# Patient Record
Sex: MALE | Race: WHITE | NOT HISPANIC OR LATINO | Employment: PART TIME | URBAN - METROPOLITAN AREA
[De-identification: names, ages, dates, MRNs, and addresses within clinical notes are randomized per-mention and may not be internally consistent; named-entity substitution may affect disease eponyms.]

---

## 2017-04-05 ENCOUNTER — HOSPITAL ENCOUNTER (EMERGENCY)
Facility: HOSPITAL | Age: 67
Discharge: HOME | End: 2017-04-06
Attending: EMERGENCY MEDICINE | Admitting: EMERGENCY MEDICINE

## 2017-04-05 ENCOUNTER — APPOINTMENT (OUTPATIENT)
Dept: GENERAL RADIOLOGY | Facility: HOSPITAL | Age: 67
End: 2017-04-05

## 2017-04-05 DIAGNOSIS — R07.89 ATYPICAL CHEST PAIN: Primary | ICD-10-CM

## 2017-04-05 LAB
ALBUMIN SERPL-MCNC: 4.6 G/DL (ref 3.2–4.8)
ALBUMIN/GLOB SERPL: 1.6 G/DL (ref 1.5–2.5)
ALP SERPL-CCNC: 93 U/L (ref 25–100)
ALT SERPL W P-5'-P-CCNC: 53 U/L (ref 7–40)
ANION GAP SERPL CALCULATED.3IONS-SCNC: 9 MMOL/L (ref 3–11)
AST SERPL-CCNC: 33 U/L (ref 0–33)
BASOPHILS # BLD AUTO: 0.03 10*3/MM3 (ref 0–0.2)
BASOPHILS NFR BLD AUTO: 0.4 % (ref 0–1)
BILIRUB SERPL-MCNC: 0.7 MG/DL (ref 0.3–1.2)
BNP SERPL-MCNC: 15 PG/ML (ref 0–100)
BUN BLD-MCNC: 15 MG/DL (ref 9–23)
BUN/CREAT SERPL: 12.5 (ref 7–25)
CALCIUM SPEC-SCNC: 9.9 MG/DL (ref 8.7–10.4)
CHLORIDE SERPL-SCNC: 101 MMOL/L (ref 99–109)
CO2 SERPL-SCNC: 29 MMOL/L (ref 20–31)
CREAT BLD-MCNC: 1.2 MG/DL (ref 0.6–1.3)
DEPRECATED RDW RBC AUTO: 44.3 FL (ref 37–54)
EOSINOPHIL # BLD AUTO: 0.12 10*3/MM3 (ref 0.1–0.3)
EOSINOPHIL NFR BLD AUTO: 1.5 % (ref 0–3)
ERYTHROCYTE [DISTWIDTH] IN BLOOD BY AUTOMATED COUNT: 13.4 % (ref 11.3–14.5)
GFR SERPL CREATININE-BSD FRML MDRD: 61 ML/MIN/1.73
GLOBULIN UR ELPH-MCNC: 2.8 GM/DL
GLUCOSE BLD-MCNC: 282 MG/DL (ref 70–100)
HCT VFR BLD AUTO: 42.6 % (ref 38.9–50.9)
HGB BLD-MCNC: 14 G/DL (ref 13.1–17.5)
IMM GRANULOCYTES # BLD: 0.01 10*3/MM3 (ref 0–0.03)
IMM GRANULOCYTES NFR BLD: 0.1 % (ref 0–0.6)
LIPASE SERPL-CCNC: 57 U/L (ref 6–51)
LYMPHOCYTES # BLD AUTO: 3 10*3/MM3 (ref 0.6–4.8)
LYMPHOCYTES NFR BLD AUTO: 36.5 % (ref 24–44)
MCH RBC QN AUTO: 30 PG (ref 27–31)
MCHC RBC AUTO-ENTMCNC: 32.9 G/DL (ref 32–36)
MCV RBC AUTO: 91.2 FL (ref 80–99)
MONOCYTES # BLD AUTO: 0.66 10*3/MM3 (ref 0–1)
MONOCYTES NFR BLD AUTO: 8 % (ref 0–12)
NEUTROPHILS # BLD AUTO: 4.41 10*3/MM3 (ref 1.5–8.3)
NEUTROPHILS NFR BLD AUTO: 53.5 % (ref 41–71)
PLATELET # BLD AUTO: 197 10*3/MM3 (ref 150–450)
PMV BLD AUTO: 9.1 FL (ref 6–12)
POTASSIUM BLD-SCNC: 3.9 MMOL/L (ref 3.5–5.5)
PROT SERPL-MCNC: 7.4 G/DL (ref 5.7–8.2)
RBC # BLD AUTO: 4.67 10*6/MM3 (ref 4.2–5.76)
SODIUM BLD-SCNC: 139 MMOL/L (ref 132–146)
TROPONIN I SERPL-MCNC: 0 NG/ML (ref 0–0.07)
WBC NRBC COR # BLD: 8.23 10*3/MM3 (ref 3.5–10.8)

## 2017-04-05 PROCEDURE — 85025 COMPLETE CBC W/AUTO DIFF WBC: CPT | Performed by: EMERGENCY MEDICINE

## 2017-04-05 PROCEDURE — 71010 HC CHEST PA OR AP: CPT

## 2017-04-05 PROCEDURE — 99283 EMERGENCY DEPT VISIT LOW MDM: CPT

## 2017-04-05 PROCEDURE — 80053 COMPREHEN METABOLIC PANEL: CPT | Performed by: EMERGENCY MEDICINE

## 2017-04-05 PROCEDURE — 84484 ASSAY OF TROPONIN QUANT: CPT

## 2017-04-05 PROCEDURE — 83880 ASSAY OF NATRIURETIC PEPTIDE: CPT | Performed by: EMERGENCY MEDICINE

## 2017-04-05 PROCEDURE — 83690 ASSAY OF LIPASE: CPT | Performed by: EMERGENCY MEDICINE

## 2017-04-05 PROCEDURE — 93005 ELECTROCARDIOGRAM TRACING: CPT

## 2017-04-05 RX ORDER — SODIUM CHLORIDE 0.9 % (FLUSH) 0.9 %
10 SYRINGE (ML) INJECTION AS NEEDED
Status: DISCONTINUED | OUTPATIENT
Start: 2017-04-05 | End: 2017-04-06 | Stop reason: HOSPADM

## 2017-04-05 RX ORDER — FAMOTIDINE 20 MG/1
20 TABLET, FILM COATED ORAL ONCE
Status: COMPLETED | OUTPATIENT
Start: 2017-04-05 | End: 2017-04-05

## 2017-04-05 RX ORDER — DICYCLOMINE HYDROCHLORIDE 10 MG/1
20 CAPSULE ORAL ONCE
Status: COMPLETED | OUTPATIENT
Start: 2017-04-05 | End: 2017-04-05

## 2017-04-05 RX ORDER — MAGNESIUM HYDROXIDE/ALUMINUM HYDROXICE/SIMETHICONE 120; 1200; 1200 MG/30ML; MG/30ML; MG/30ML
30 SUSPENSION ORAL ONCE
Status: COMPLETED | OUTPATIENT
Start: 2017-04-05 | End: 2017-04-05

## 2017-04-05 RX ORDER — METFORMIN HYDROCHLORIDE 500 MG/1
500 TABLET, EXTENDED RELEASE ORAL
COMMUNITY
End: 2017-07-14

## 2017-04-05 RX ORDER — ASPIRIN 81 MG/1
324 TABLET, CHEWABLE ORAL ONCE
Status: COMPLETED | OUTPATIENT
Start: 2017-04-05 | End: 2017-04-05

## 2017-04-05 RX ADMIN — DICYCLOMINE HYDROCHLORIDE 20 MG: 10 CAPSULE ORAL at 23:27

## 2017-04-05 RX ADMIN — ALUMINUM HYDROXIDE, MAGNESIUM HYDROXIDE, AND SIMETHICONE 30 ML: 200; 200; 20 SUSPENSION ORAL at 23:27

## 2017-04-05 RX ADMIN — ASPIRIN 324 MG: 81 TABLET, CHEWABLE ORAL at 20:58

## 2017-04-05 RX ADMIN — LIDOCAINE HYDROCHLORIDE 15 ML: 20 SOLUTION ORAL; TOPICAL at 23:27

## 2017-04-05 RX ADMIN — FAMOTIDINE 20 MG: 20 TABLET, FILM COATED ORAL at 23:27

## 2017-04-06 VITALS
HEIGHT: 72 IN | WEIGHT: 250 LBS | HEART RATE: 60 BPM | BODY MASS INDEX: 33.86 KG/M2 | SYSTOLIC BLOOD PRESSURE: 143 MMHG | TEMPERATURE: 98 F | OXYGEN SATURATION: 96 % | DIASTOLIC BLOOD PRESSURE: 86 MMHG | RESPIRATION RATE: 18 BRPM

## 2017-04-06 LAB
HOLD SPECIMEN: NORMAL
HOLD SPECIMEN: NORMAL
WHOLE BLOOD HOLD SPECIMEN: NORMAL
WHOLE BLOOD HOLD SPECIMEN: NORMAL

## 2017-04-06 RX ORDER — SUCRALFATE 1 G/1
1 TABLET ORAL
Qty: 60 TABLET | Refills: 0 | Status: SHIPPED | OUTPATIENT
Start: 2017-04-06 | End: 2017-07-14

## 2017-04-06 RX ORDER — DICYCLOMINE HCL 20 MG
20 TABLET ORAL EVERY 6 HOURS PRN
Qty: 20 TABLET | Refills: 0 | Status: SHIPPED | OUTPATIENT
Start: 2017-04-06 | End: 2017-07-14

## 2017-04-06 NOTE — ED PROVIDER NOTES
Subjective   HPI Comments: Maxime Rodriguez is a 66 y.o.male who presents to the ED with c/o chest pain which onset one week ago. He reports a sudden onset of substernal and left sided chest pain radiating into his neck after eating one week ago. He describes the pain as a soreness and pressure. He initially took Gas-X without any relief. The pain remained constant and he was seen at his PCP office yesterday for his symptoms. He was started on Prilosec at that time without any relief. He has history of heartburn but states the episodes have never lasted this long. He notes he had a negative stress test performed on March 13th. He was also started on Metformin roughly 30 days ago. He denies any other complaints at this time.       Patient is a 66 y.o. male presenting with chest pain.   History provided by:  Patient  Chest Pain   Pain location:  Substernal area and L chest  Pain quality: pressure    Pain quality comment:  Soreness  Pain radiates to:  Neck  Pain severity:  Moderate  Onset quality:  Sudden  Duration:  1 week  Timing:  Constant  Progression:  Unchanged  Chronicity:  New  Context: eating    Relieved by:  Nothing  Worsened by:  Nothing  Ineffective treatments: Gas X and Prilosec.  Associated symptoms: no abdominal pain, no back pain, no cough, no diaphoresis, no dizziness, no fever, no headache, no lower extremity edema, no nausea, no shortness of breath, no vomiting and no weakness    Risk factors: diabetes mellitus and obesity        Review of Systems   Constitutional: Negative for chills, diaphoresis and fever.   HENT: Negative for congestion, rhinorrhea and sore throat.    Respiratory: Negative for cough and shortness of breath.    Cardiovascular: Positive for chest pain.   Gastrointestinal: Negative for abdominal pain, diarrhea, nausea and vomiting.   Musculoskeletal: Negative for back pain and neck pain.   Neurological: Negative for dizziness, weakness, light-headedness and headaches.   All other systems  reviewed and are negative.      Past Medical History:   Diagnosis Date   • Diabetes mellitus    • Hyperlipidemia    • Hypertension        Allergies   Allergen Reactions   • Atorvastatin    • Augmentin [Amoxicillin-Pot Clavulanate]    • Cephalexin    • Penicillins        Past Surgical History:   Procedure Laterality Date   • HEMORRHOIDECTOMY     • TONSILLECTOMY         History reviewed. No pertinent family history.    Social History     Social History   • Marital status:      Spouse name: N/A   • Number of children: N/A   • Years of education: N/A     Social History Main Topics   • Smoking status: Never Smoker   • Smokeless tobacco: None   • Alcohol use No   • Drug use: No   • Sexual activity: Defer     Other Topics Concern   • None     Social History Narrative   • None         Objective   Physical Exam   Constitutional: He is oriented to person, place, and time. He appears well-developed and well-nourished. No distress.   HENT:   Head: Normocephalic and atraumatic.   Nose: Nose normal.   Mouth/Throat: Oropharynx is clear and moist.   Eyes: Conjunctivae are normal. Pupils are equal, round, and reactive to light. No scleral icterus.   Neck: Normal range of motion. Neck supple.   Cardiovascular: Normal rate, regular rhythm and normal heart sounds.    Pulmonary/Chest: Effort normal and breath sounds normal. No respiratory distress.   Abdominal: Soft. Bowel sounds are normal. There is no tenderness.   Musculoskeletal: Normal range of motion. He exhibits no edema.   Neurological: He is alert and oriented to person, place, and time.   Skin: Skin is warm and dry.   Psychiatric: He has a normal mood and affect. His behavior is normal.   Nursing note and vitals reviewed.      Procedures         ED Course  ED Course     EKG NSR, LAD.  CXR negative.  Pt had negative nuclear stress test last month which is reassuring.  Pain better after meds.  Patient stable on serial rechecks.  Discussed findings, concerns, plan of care,  expected course, reasons to return and followup.                  MDM  Number of Diagnoses or Management Options  Atypical chest pain:      Amount and/or Complexity of Data Reviewed  Clinical lab tests: reviewed and ordered  Tests in the radiology section of CPT®: ordered and reviewed  Decide to obtain previous medical records or to obtain history from someone other than the patient: yes  Independent visualization of images, tracings, or specimens: yes        Final diagnoses:   Atypical chest pain       Documentation assistance provided by kyara Friedman.  Information recorded by the scribe was done at my direction and has been verified and validated by me.     Tabitha Friedman  04/05/17 2251       Tabitha Friedman  04/05/17 2253       Nicolas Harman MD  04/06/17 0053

## 2017-07-14 ENCOUNTER — OFFICE VISIT (OUTPATIENT)
Dept: NEUROSURGERY | Facility: CLINIC | Age: 67
End: 2017-07-14

## 2017-07-14 ENCOUNTER — HOSPITAL ENCOUNTER (OUTPATIENT)
Dept: GENERAL RADIOLOGY | Facility: HOSPITAL | Age: 67
Discharge: HOME | End: 2017-07-14
Admitting: PHYSICIAN ASSISTANT

## 2017-07-14 VITALS — WEIGHT: 242 LBS | BODY MASS INDEX: 32.78 KG/M2 | HEIGHT: 72 IN | TEMPERATURE: 98.6 F

## 2017-07-14 DIAGNOSIS — Z98.1 STATUS POST CERVICAL SPINAL FUSION: Primary | ICD-10-CM

## 2017-07-14 DIAGNOSIS — Z98.1 STATUS POST CERVICAL SPINAL FUSION: ICD-10-CM

## 2017-07-14 DIAGNOSIS — M50.30 DEGENERATION OF CERVICAL INTERVERTEBRAL DISC: ICD-10-CM

## 2017-07-14 PROCEDURE — 72040 X-RAY EXAM NECK SPINE 2-3 VW: CPT

## 2017-07-14 PROCEDURE — 99214 OFFICE O/P EST MOD 30 MIN: CPT | Performed by: PHYSICIAN ASSISTANT

## 2017-07-14 NOTE — PROGRESS NOTES
Patient: Maxime Rodriguez  : 1950  Chart #: [id number]    Date of Service: 2017    CHIEF COMPLAINT: Numbness in the ulnar fingers    History of Present Illness Mr. 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  On 2011 he underwent an uncomplicated ACDF with allografting and plating at C6 7.  Postoperatively he's had some intermittent numbness in the ulnar fingers but overall he has done quite well.  5 days ago he woke up in the middle of the night with numbness involving the pinky and ring finger on the left.  This subsided after a couple hours.  The patient was concerned because normally it only takes a few minutes to go away.  Occasionally he wakes up with stiffness in his neck but denies significant pain  He has no numbness, weakness, or pain extending down his arms.  He has no numbness in his fingers today. No walking difficulties or bowel or bladder issues.    The following portions of the patient's history were reviewed and updated as appropriate: allergies, current medications, past family history, past medical history, past social history, past surgical history and problem list.    Review of Systems   Constitutional: Negative for activity change, appetite change, chills, diaphoresis, fatigue, fever and unexpected weight change.   HENT: Negative for congestion, dental problem, drooling, ear discharge, ear pain, facial swelling, hearing loss, mouth sores, nosebleeds, postnasal drip, rhinorrhea, sinus pressure, sneezing, sore throat, tinnitus, trouble swallowing and voice change.    Eyes: Negative for photophobia, pain, discharge, redness, itching and visual disturbance.   Respiratory: Negative for apnea, cough, choking, chest tightness, shortness of breath, wheezing and stridor.    Cardiovascular: Negative for chest pain, palpitations and leg swelling.   Gastrointestinal: Negative for abdominal distention, abdominal pain,  "anal bleeding, blood in stool, constipation, diarrhea, nausea, rectal pain and vomiting.   Endocrine: Negative for cold intolerance, heat intolerance, polydipsia, polyphagia and polyuria.   Genitourinary: Negative for decreased urine volume, difficulty urinating, dysuria, enuresis, flank pain, frequency, genital sores, hematuria and urgency.   Musculoskeletal: Negative for arthralgias, back pain, gait problem, joint swelling, myalgias, neck pain and neck stiffness.   Skin: Negative for color change, pallor, rash and wound.   Allergic/Immunologic: Negative for environmental allergies, food allergies and immunocompromised state.   Neurological: Positive for numbness. Negative for dizziness, tremors, seizures, syncope, facial asymmetry, speech difficulty, weakness, light-headedness and headaches.   Hematological: Negative for adenopathy. Does not bruise/bleed easily.   Psychiatric/Behavioral: Negative for agitation, behavioral problems, confusion, decreased concentration, dysphoric mood, hallucinations, self-injury, sleep disturbance and suicidal ideas. The patient is not nervous/anxious and is not hyperactive.    All other systems reviewed and are negative.      Objective   Vital Signs: Temperature 98.6 °F (37 °C), temperature source Temporal Artery , height 72\" (182.9 cm), weight 242 lb (110 kg).  Physical Exam   Constitutional: He appears well-developed and well-nourished. No distress.   HENT:   Head: Normocephalic and atraumatic.   Eyes: EOM are normal. Pupils are equal, round, and reactive to light.   Cardiovascular: Normal rate, regular rhythm and normal heart sounds.    Pulmonary/Chest: Effort normal and breath sounds normal.   Psychiatric: He has a normal mood and affect. His behavior is normal. Thought content normal.   Nursing note and vitals reviewed.  Musculoskeletal: Range of motion of the neck is normal.  Strength is intact in upper and lower extremities to direct testing.  Muscle is normal " throughout.  Station and gait are normal.  Neurologic:  Gait: Able to tandem walk without difficulty.  Coordination is intact.  Finger to nose, heel-to-shin, rapid alternating movements.  Deep tendon reflexes: Difficult to elicit throughout.  Sensation is intact to light touch throughout.  Patient is oriented to person, place, and time.    Cornelia sign negative.  No ankle clonus    Assessment/Plan   Medical Decision Making: Mr. Rodriguez is a 66-year-old gentleman who underwent ACDF C6 7 in 2011 and has done well.  He had some transient numbness in the ulnar fingers a week ago and was concerned that he had done something to the hardware in his neck.  His symptoms could be related to a couple things but most likely due to his positioning while sleeping.  I sent him for some x-rays of the cervical spine and will call him with the results for reassurance.  If his symptoms become persistent he will call our office and I will see him for reevaluation.             Annita Patino PA-C  Patient Care Team:  Georgi Lanza MD as PCP - General (Internal Medicine)  Georgi Lanza MD as Referring Physician (Internal Medicine)            ADDENDUM: I called patient to notify him of his x-ray results.  Anterior fusion noted at C6 and C7 without hardware complication or malalignment.  Some mild degenerative changes noted at other levels.

## 2018-03-28 ENCOUNTER — APPOINTMENT (OUTPATIENT)
Dept: GENERAL RADIOLOGY | Facility: HOSPITAL | Age: 68
End: 2018-03-28

## 2018-03-28 ENCOUNTER — HOSPITAL ENCOUNTER (INPATIENT)
Facility: HOSPITAL | Age: 68
LOS: 2 days | Discharge: HOME | End: 2018-03-30
Attending: EMERGENCY MEDICINE | Admitting: INTERNAL MEDICINE

## 2018-03-28 DIAGNOSIS — I21.4 NON-STEMI (NON-ST ELEVATED MYOCARDIAL INFARCTION) (HCC): Primary | ICD-10-CM

## 2018-03-28 DIAGNOSIS — R07.9 CHEST PAIN, UNSPECIFIED TYPE: ICD-10-CM

## 2018-03-28 PROBLEM — E78.5 HYPERLIPIDEMIA: Status: ACTIVE | Noted: 2018-03-28

## 2018-03-28 PROBLEM — I10 HYPERTENSION: Status: ACTIVE | Noted: 2018-03-28

## 2018-03-28 PROBLEM — E11.9 DIABETES MELLITUS: Status: ACTIVE | Noted: 2018-03-28

## 2018-03-28 LAB
ALBUMIN SERPL-MCNC: 4.2 G/DL (ref 3.2–4.8)
ALBUMIN/GLOB SERPL: 1.8 G/DL (ref 1.5–2.5)
ALP SERPL-CCNC: 95 U/L (ref 25–100)
ALT SERPL W P-5'-P-CCNC: 56 U/L (ref 7–40)
ANION GAP SERPL CALCULATED.3IONS-SCNC: 11 MMOL/L (ref 3–11)
AST SERPL-CCNC: 36 U/L (ref 0–33)
BASOPHILS # BLD AUTO: 0.04 10*3/MM3 (ref 0–0.2)
BASOPHILS NFR BLD AUTO: 0.7 % (ref 0–1)
BILIRUB SERPL-MCNC: 0.7 MG/DL (ref 0.3–1.2)
BNP SERPL-MCNC: 10 PG/ML (ref 0–100)
BUN BLD-MCNC: 15 MG/DL (ref 9–23)
BUN/CREAT SERPL: 10.7 (ref 7–25)
CALCIUM SPEC-SCNC: 9 MG/DL (ref 8.7–10.4)
CHLORIDE SERPL-SCNC: 103 MMOL/L (ref 99–109)
CO2 SERPL-SCNC: 24 MMOL/L (ref 20–31)
CREAT BLD-MCNC: 1.4 MG/DL (ref 0.6–1.3)
DEPRECATED RDW RBC AUTO: 41.2 FL (ref 37–54)
EOSINOPHIL # BLD AUTO: 0.19 10*3/MM3 (ref 0–0.3)
EOSINOPHIL NFR BLD AUTO: 3.2 % (ref 0–3)
ERYTHROCYTE [DISTWIDTH] IN BLOOD BY AUTOMATED COUNT: 12.9 % (ref 11.3–14.5)
GFR SERPL CREATININE-BSD FRML MDRD: 51 ML/MIN/1.73
GLOBULIN UR ELPH-MCNC: 2.4 GM/DL
GLUCOSE BLD-MCNC: 271 MG/DL (ref 70–100)
GLUCOSE BLDC GLUCOMTR-MCNC: 169 MG/DL (ref 70–130)
HCT VFR BLD AUTO: 40.9 % (ref 38.9–50.9)
HGB BLD-MCNC: 13.8 G/DL (ref 13.1–17.5)
HOLD SPECIMEN: NORMAL
HOLD SPECIMEN: NORMAL
IMM GRANULOCYTES # BLD: 0 10*3/MM3 (ref 0–0.03)
IMM GRANULOCYTES NFR BLD: 0 % (ref 0–0.6)
LIPASE SERPL-CCNC: 54 U/L (ref 6–51)
LYMPHOCYTES # BLD AUTO: 2.09 10*3/MM3 (ref 0.6–4.8)
LYMPHOCYTES NFR BLD AUTO: 35.7 % (ref 24–44)
MCH RBC QN AUTO: 29.6 PG (ref 27–31)
MCHC RBC AUTO-ENTMCNC: 33.7 G/DL (ref 32–36)
MCV RBC AUTO: 87.8 FL (ref 80–99)
MONOCYTES # BLD AUTO: 0.43 10*3/MM3 (ref 0–1)
MONOCYTES NFR BLD AUTO: 7.4 % (ref 0–12)
NEUTROPHILS # BLD AUTO: 3.1 10*3/MM3 (ref 1.5–8.3)
NEUTROPHILS NFR BLD AUTO: 53 % (ref 41–71)
PLATELET # BLD AUTO: 176 10*3/MM3 (ref 150–450)
PMV BLD AUTO: 9.1 FL (ref 6–12)
POTASSIUM BLD-SCNC: 4 MMOL/L (ref 3.5–5.5)
PROT SERPL-MCNC: 6.6 G/DL (ref 5.7–8.2)
RBC # BLD AUTO: 4.66 10*6/MM3 (ref 4.2–5.76)
SODIUM BLD-SCNC: 138 MMOL/L (ref 132–146)
TROPONIN I SERPL-MCNC: 0.01 NG/ML (ref 0–0.07)
TROPONIN I SERPL-MCNC: 0.11 NG/ML (ref 0–0.07)
WBC NRBC COR # BLD: 5.85 10*3/MM3 (ref 3.5–10.8)
WHOLE BLOOD HOLD SPECIMEN: NORMAL
WHOLE BLOOD HOLD SPECIMEN: NORMAL

## 2018-03-28 PROCEDURE — 93005 ELECTROCARDIOGRAM TRACING: CPT | Performed by: EMERGENCY MEDICINE

## 2018-03-28 PROCEDURE — 71045 X-RAY EXAM CHEST 1 VIEW: CPT

## 2018-03-28 PROCEDURE — 99223 1ST HOSP IP/OBS HIGH 75: CPT | Performed by: HOSPITALIST

## 2018-03-28 PROCEDURE — 80053 COMPREHEN METABOLIC PANEL: CPT | Performed by: EMERGENCY MEDICINE

## 2018-03-28 PROCEDURE — 25010000002 ENOXAPARIN PER 10 MG: Performed by: EMERGENCY MEDICINE

## 2018-03-28 PROCEDURE — 83690 ASSAY OF LIPASE: CPT | Performed by: EMERGENCY MEDICINE

## 2018-03-28 PROCEDURE — 83880 ASSAY OF NATRIURETIC PEPTIDE: CPT | Performed by: EMERGENCY MEDICINE

## 2018-03-28 PROCEDURE — 82962 GLUCOSE BLOOD TEST: CPT

## 2018-03-28 PROCEDURE — 84484 ASSAY OF TROPONIN QUANT: CPT

## 2018-03-28 PROCEDURE — 84484 ASSAY OF TROPONIN QUANT: CPT | Performed by: NURSE PRACTITIONER

## 2018-03-28 PROCEDURE — 85025 COMPLETE CBC W/AUTO DIFF WBC: CPT | Performed by: EMERGENCY MEDICINE

## 2018-03-28 PROCEDURE — 99284 EMERGENCY DEPT VISIT MOD MDM: CPT

## 2018-03-28 RX ORDER — ALISKIREN 150 MG/1
150 TABLET, FILM COATED ORAL DAILY
COMMUNITY
End: 2018-05-01 | Stop reason: DRUGHIGH

## 2018-03-28 RX ORDER — CLOPIDOGREL BISULFATE 75 MG/1
75 TABLET ORAL DAILY
Status: DISCONTINUED | OUTPATIENT
Start: 2018-03-29 | End: 2018-03-30 | Stop reason: HOSPADM

## 2018-03-28 RX ORDER — ASPIRIN 81 MG/1
324 TABLET, CHEWABLE ORAL ONCE
Status: DISCONTINUED | OUTPATIENT
Start: 2018-03-28 | End: 2018-03-28

## 2018-03-28 RX ORDER — ACETAMINOPHEN 325 MG/1
650 TABLET ORAL EVERY 4 HOURS PRN
Status: DISCONTINUED | OUTPATIENT
Start: 2018-03-28 | End: 2018-03-30 | Stop reason: HOSPADM

## 2018-03-28 RX ORDER — DEXTROSE MONOHYDRATE 25 G/50ML
25 INJECTION, SOLUTION INTRAVENOUS
Status: DISCONTINUED | OUTPATIENT
Start: 2018-03-28 | End: 2018-03-30 | Stop reason: HOSPADM

## 2018-03-28 RX ORDER — CLOPIDOGREL BISULFATE 75 MG/1
600 TABLET ORAL ONCE
Status: COMPLETED | OUTPATIENT
Start: 2018-03-28 | End: 2018-03-28

## 2018-03-28 RX ORDER — PANTOPRAZOLE SODIUM 40 MG/1
40 TABLET, DELAYED RELEASE ORAL
Status: DISCONTINUED | OUTPATIENT
Start: 2018-03-28 | End: 2018-03-30 | Stop reason: HOSPADM

## 2018-03-28 RX ORDER — SODIUM CHLORIDE 0.9 % (FLUSH) 0.9 %
1-10 SYRINGE (ML) INJECTION AS NEEDED
Status: DISCONTINUED | OUTPATIENT
Start: 2018-03-28 | End: 2018-03-30 | Stop reason: HOSPADM

## 2018-03-28 RX ORDER — ONDANSETRON 2 MG/ML
4 INJECTION INTRAMUSCULAR; INTRAVENOUS EVERY 6 HOURS PRN
Status: DISCONTINUED | OUTPATIENT
Start: 2018-03-28 | End: 2018-03-30 | Stop reason: HOSPADM

## 2018-03-28 RX ORDER — NICOTINE POLACRILEX 4 MG
15 LOZENGE BUCCAL
Status: DISCONTINUED | OUTPATIENT
Start: 2018-03-28 | End: 2018-03-30 | Stop reason: HOSPADM

## 2018-03-28 RX ORDER — ONDANSETRON 4 MG/1
4 TABLET, FILM COATED ORAL EVERY 6 HOURS PRN
Status: DISCONTINUED | OUTPATIENT
Start: 2018-03-28 | End: 2018-03-30 | Stop reason: HOSPADM

## 2018-03-28 RX ORDER — OMEPRAZOLE 20 MG/1
20 CAPSULE, DELAYED RELEASE ORAL DAILY
COMMUNITY
End: 2020-03-11

## 2018-03-28 RX ORDER — NITROGLYCERIN 0.4 MG/1
0.4 TABLET SUBLINGUAL
Status: DISCONTINUED | OUTPATIENT
Start: 2018-03-28 | End: 2018-03-30 | Stop reason: HOSPADM

## 2018-03-28 RX ORDER — DOCUSATE SODIUM 100 MG/1
100 CAPSULE, LIQUID FILLED ORAL 2 TIMES DAILY
Status: DISCONTINUED | OUTPATIENT
Start: 2018-03-28 | End: 2018-03-30 | Stop reason: HOSPADM

## 2018-03-28 RX ORDER — SODIUM CHLORIDE 0.9 % (FLUSH) 0.9 %
10 SYRINGE (ML) INJECTION AS NEEDED
Status: DISCONTINUED | OUTPATIENT
Start: 2018-03-28 | End: 2018-03-30 | Stop reason: HOSPADM

## 2018-03-28 RX ADMIN — NITROGLYCERIN 0.4 MG: 0.4 TABLET SUBLINGUAL at 18:43

## 2018-03-28 RX ADMIN — CLOPIDOGREL BISULFATE 600 MG: 75 TABLET ORAL at 18:44

## 2018-03-28 RX ADMIN — ENOXAPARIN SODIUM 110 MG: 60 INJECTION SUBCUTANEOUS at 18:46

## 2018-03-29 ENCOUNTER — APPOINTMENT (OUTPATIENT)
Dept: CARDIOLOGY | Facility: HOSPITAL | Age: 68
End: 2018-03-29

## 2018-03-29 LAB
ALBUMIN SERPL-MCNC: 4.4 G/DL (ref 3.2–4.8)
ALBUMIN/GLOB SERPL: 2 G/DL (ref 1.5–2.5)
ALP SERPL-CCNC: 82 U/L (ref 25–100)
ALT SERPL W P-5'-P-CCNC: 53 U/L (ref 7–40)
ANION GAP SERPL CALCULATED.3IONS-SCNC: 6 MMOL/L (ref 3–11)
AST SERPL-CCNC: 31 U/L (ref 0–33)
BASOPHILS # BLD AUTO: 0.07 10*3/MM3 (ref 0–0.2)
BASOPHILS NFR BLD AUTO: 1 % (ref 0–1)
BH CV ECHO MEAS - AO ROOT AREA (BSA CORRECTED): 1.2
BH CV ECHO MEAS - AO ROOT AREA: 6.2 CM^2
BH CV ECHO MEAS - AO ROOT DIAM: 2.8 CM
BH CV ECHO MEAS - BSA(HAYCOCK): 2.4 M^2
BH CV ECHO MEAS - BSA: 2.3 M^2
BH CV ECHO MEAS - BZI_BMI: 33.5 KILOGRAMS/M^2
BH CV ECHO MEAS - BZI_METRIC_HEIGHT: 182.9 CM
BH CV ECHO MEAS - BZI_METRIC_WEIGHT: 112 KG
BH CV ECHO MEAS - CONTRAST EF (2CH): 62.3 ML/M^2
BH CV ECHO MEAS - CONTRAST EF 4CH: 57.7 ML/M^2
BH CV ECHO MEAS - EDV(CUBED): 114.1 ML
BH CV ECHO MEAS - EDV(MOD-SP2): 106 ML
BH CV ECHO MEAS - EDV(MOD-SP4): 111 ML
BH CV ECHO MEAS - EDV(TEICH): 110.2 ML
BH CV ECHO MEAS - EF(CUBED): 67 %
BH CV ECHO MEAS - EF(MOD-SP2): 62.3 %
BH CV ECHO MEAS - EF(MOD-SP4): 57.7 %
BH CV ECHO MEAS - EF(TEICH): 58.4 %
BH CV ECHO MEAS - ESV(CUBED): 37.7 ML
BH CV ECHO MEAS - ESV(MOD-SP2): 40 ML
BH CV ECHO MEAS - ESV(MOD-SP4): 47 ML
BH CV ECHO MEAS - ESV(TEICH): 45.8 ML
BH CV ECHO MEAS - FS: 30.9 %
BH CV ECHO MEAS - IVS/LVPW: 0.95
BH CV ECHO MEAS - IVSD: 1 CM
BH CV ECHO MEAS - LA DIMENSION: 4.1 CM
BH CV ECHO MEAS - LA/AO: 1.4
BH CV ECHO MEAS - LAT PEAK E' VEL: 10.7 CM/SEC
BH CV ECHO MEAS - LV DIASTOLIC VOL/BSA (35-75): 47.6 ML/M^2
BH CV ECHO MEAS - LV MASS(C)D: 185 GRAMS
BH CV ECHO MEAS - LV MASS(C)DI: 79.4 GRAMS/M^2
BH CV ECHO MEAS - LV SYSTOLIC VOL/BSA (12-30): 20.2 ML/M^2
BH CV ECHO MEAS - LVIDD: 4.9 CM
BH CV ECHO MEAS - LVIDS: 3.4 CM
BH CV ECHO MEAS - LVLD AP2: 7.6 CM
BH CV ECHO MEAS - LVLD AP4: 8 CM
BH CV ECHO MEAS - LVLS AP2: 6.8 CM
BH CV ECHO MEAS - LVLS AP4: 6.8 CM
BH CV ECHO MEAS - LVPWD: 1 CM
BH CV ECHO MEAS - MED PEAK E' VEL: 5.45 CM/SEC
BH CV ECHO MEAS - MV A MAX VEL: 78.8 CM/SEC
BH CV ECHO MEAS - MV E MAX VEL: 63.8 CM/SEC
BH CV ECHO MEAS - MV E/A: 0.81
BH CV ECHO MEAS - PA ACC SLOPE: 655.1 CM/SEC^2
BH CV ECHO MEAS - PA ACC TIME: 0.1 SEC
BH CV ECHO MEAS - PA PR(ACCEL): 35 MMHG
BH CV ECHO MEAS - PI END-D VEL: 142.3 CM/SEC
BH CV ECHO MEAS - RAP SYSTOLE: 3 MMHG
BH CV ECHO MEAS - RVDD: 2.8 CM
BH CV ECHO MEAS - RVSP: 23 MMHG
BH CV ECHO MEAS - SI(CUBED): 32.8 ML/M^2
BH CV ECHO MEAS - SI(MOD-SP2): 28.3 ML/M^2
BH CV ECHO MEAS - SI(MOD-SP4): 27.5 ML/M^2
BH CV ECHO MEAS - SI(TEICH): 27.6 ML/M^2
BH CV ECHO MEAS - SV(CUBED): 76.5 ML
BH CV ECHO MEAS - SV(MOD-SP2): 66 ML
BH CV ECHO MEAS - SV(MOD-SP4): 64 ML
BH CV ECHO MEAS - SV(TEICH): 64.3 ML
BH CV ECHO MEAS - TAPSE (>1.6): 2.2 CM2
BH CV ECHO MEAS - TR MAX VEL: 221.8 CM/SEC
BH CV VAS BP LEFT ARM: NORMAL MMHG
BH CV XLRA - RV BASE: 3.2 CM
BH CV XLRA - RV LENGTH: 8 CM
BH CV XLRA - RV MID: 3.1 CM
BH CV XLRA - TDI S': 12.3 CM/SEC
BILIRUB SERPL-MCNC: 0.5 MG/DL (ref 0.3–1.2)
BUN BLD-MCNC: 18 MG/DL (ref 9–23)
BUN/CREAT SERPL: 15 (ref 7–25)
CALCIUM SPEC-SCNC: 9.3 MG/DL (ref 8.7–10.4)
CHLORIDE SERPL-SCNC: 104 MMOL/L (ref 99–109)
CO2 SERPL-SCNC: 30 MMOL/L (ref 20–31)
CREAT BLD-MCNC: 1.2 MG/DL (ref 0.6–1.3)
DEPRECATED RDW RBC AUTO: 41.5 FL (ref 37–54)
E/E' RATIO: 8.8
EOSINOPHIL # BLD AUTO: 0.24 10*3/MM3 (ref 0–0.3)
EOSINOPHIL NFR BLD AUTO: 3.5 % (ref 0–3)
ERYTHROCYTE [DISTWIDTH] IN BLOOD BY AUTOMATED COUNT: 12.8 % (ref 11.3–14.5)
GFR SERPL CREATININE-BSD FRML MDRD: 60 ML/MIN/1.73
GLOBULIN UR ELPH-MCNC: 2.2 GM/DL
GLUCOSE BLD-MCNC: 231 MG/DL (ref 70–100)
GLUCOSE BLDC GLUCOMTR-MCNC: 200 MG/DL (ref 70–130)
GLUCOSE BLDC GLUCOMTR-MCNC: 207 MG/DL (ref 70–130)
GLUCOSE BLDC GLUCOMTR-MCNC: 217 MG/DL (ref 70–130)
GLUCOSE BLDC GLUCOMTR-MCNC: 227 MG/DL (ref 70–130)
HCT VFR BLD AUTO: 41.9 % (ref 38.9–50.9)
HGB BLD-MCNC: 14.2 G/DL (ref 13.1–17.5)
HOLD SPECIMEN: NORMAL
IMM GRANULOCYTES # BLD: 0.02 10*3/MM3 (ref 0–0.03)
IMM GRANULOCYTES NFR BLD: 0.3 % (ref 0–0.6)
LEFT ATRIUM VOLUME INDEX: 21 ML/M2
LEFT ATRIUM VOLUME: 49 CM3
LV EF 2D ECHO EST: 60 %
LYMPHOCYTES # BLD AUTO: 2.59 10*3/MM3 (ref 0.6–4.8)
LYMPHOCYTES NFR BLD AUTO: 38.2 % (ref 24–44)
MCH RBC QN AUTO: 30 PG (ref 27–31)
MCHC RBC AUTO-ENTMCNC: 33.9 G/DL (ref 32–36)
MCV RBC AUTO: 88.4 FL (ref 80–99)
MONOCYTES # BLD AUTO: 0.75 10*3/MM3 (ref 0–1)
MONOCYTES NFR BLD AUTO: 11.1 % (ref 0–12)
NEUTROPHILS # BLD AUTO: 3.11 10*3/MM3 (ref 1.5–8.3)
NEUTROPHILS NFR BLD AUTO: 45.9 % (ref 41–71)
PLATELET # BLD AUTO: 174 10*3/MM3 (ref 150–450)
PMV BLD AUTO: 9.3 FL (ref 6–12)
POTASSIUM BLD-SCNC: 4.3 MMOL/L (ref 3.5–5.5)
PROT SERPL-MCNC: 6.6 G/DL (ref 5.7–8.2)
RBC # BLD AUTO: 4.74 10*6/MM3 (ref 4.2–5.76)
SODIUM BLD-SCNC: 140 MMOL/L (ref 132–146)
TROPONIN I SERPL-MCNC: 0.23 NG/ML
TROPONIN I SERPL-MCNC: 0.24 NG/ML
TROPONIN I SERPL-MCNC: 0.31 NG/ML
TROPONIN I SERPL-MCNC: 0.45 NG/ML
WBC NRBC COR # BLD: 6.78 10*3/MM3 (ref 3.5–10.8)

## 2018-03-29 PROCEDURE — 85025 COMPLETE CBC W/AUTO DIFF WBC: CPT | Performed by: NURSE PRACTITIONER

## 2018-03-29 PROCEDURE — 027034Z DILATION OF CORONARY ARTERY, ONE ARTERY WITH DRUG-ELUTING INTRALUMINAL DEVICE, PERCUTANEOUS APPROACH: ICD-10-PCS | Performed by: INTERNAL MEDICINE

## 2018-03-29 PROCEDURE — 25010000002 MIDAZOLAM PER 1 MG: Performed by: INTERNAL MEDICINE

## 2018-03-29 PROCEDURE — B2151ZZ FLUOROSCOPY OF LEFT HEART USING LOW OSMOLAR CONTRAST: ICD-10-PCS | Performed by: INTERNAL MEDICINE

## 2018-03-29 PROCEDURE — B2111ZZ FLUOROSCOPY OF MULTIPLE CORONARY ARTERIES USING LOW OSMOLAR CONTRAST: ICD-10-PCS | Performed by: INTERNAL MEDICINE

## 2018-03-29 PROCEDURE — 25010000002 BIVALIRUDIN PER 1 MG: Performed by: INTERNAL MEDICINE

## 2018-03-29 PROCEDURE — 80053 COMPREHEN METABOLIC PANEL: CPT | Performed by: NURSE PRACTITIONER

## 2018-03-29 PROCEDURE — 25010000002 FENTANYL CITRATE (PF) 100 MCG/2ML SOLUTION: Performed by: INTERNAL MEDICINE

## 2018-03-29 PROCEDURE — C1894 INTRO/SHEATH, NON-LASER: HCPCS | Performed by: INTERNAL MEDICINE

## 2018-03-29 PROCEDURE — C9600 PERC DRUG-EL COR STENT SING: HCPCS | Performed by: INTERNAL MEDICINE

## 2018-03-29 PROCEDURE — 93458 L HRT ARTERY/VENTRICLE ANGIO: CPT | Performed by: INTERNAL MEDICINE

## 2018-03-29 PROCEDURE — 93010 ELECTROCARDIOGRAM REPORT: CPT | Performed by: INTERNAL MEDICINE

## 2018-03-29 PROCEDURE — 99232 SBSQ HOSP IP/OBS MODERATE 35: CPT | Performed by: INTERNAL MEDICINE

## 2018-03-29 PROCEDURE — 4A023N7 MEASUREMENT OF CARDIAC SAMPLING AND PRESSURE, LEFT HEART, PERCUTANEOUS APPROACH: ICD-10-PCS | Performed by: INTERNAL MEDICINE

## 2018-03-29 PROCEDURE — 25010000002 ONDANSETRON PER 1 MG: Performed by: NURSE PRACTITIONER

## 2018-03-29 PROCEDURE — 0 IOPAMIDOL PER 1 ML: Performed by: INTERNAL MEDICINE

## 2018-03-29 PROCEDURE — C1887 CATHETER, GUIDING: HCPCS | Performed by: INTERNAL MEDICINE

## 2018-03-29 PROCEDURE — C1874 STENT, COATED/COV W/DEL SYS: HCPCS | Performed by: INTERNAL MEDICINE

## 2018-03-29 PROCEDURE — C1769 GUIDE WIRE: HCPCS | Performed by: INTERNAL MEDICINE

## 2018-03-29 PROCEDURE — 92928 PRQ TCAT PLMT NTRAC ST 1 LES: CPT | Performed by: INTERNAL MEDICINE

## 2018-03-29 PROCEDURE — S0260 H&P FOR SURGERY: HCPCS | Performed by: INTERNAL MEDICINE

## 2018-03-29 PROCEDURE — 93306 TTE W/DOPPLER COMPLETE: CPT | Performed by: INTERNAL MEDICINE

## 2018-03-29 PROCEDURE — 25010000002 HEPARIN (PORCINE) PER 1000 UNITS: Performed by: INTERNAL MEDICINE

## 2018-03-29 PROCEDURE — 25010000002 ENOXAPARIN PER 10 MG: Performed by: NURSE PRACTITIONER

## 2018-03-29 PROCEDURE — 63710000001 INSULIN LISPRO (HUMAN) PER 5 UNITS: Performed by: NURSE PRACTITIONER

## 2018-03-29 PROCEDURE — 93306 TTE W/DOPPLER COMPLETE: CPT

## 2018-03-29 PROCEDURE — 93005 ELECTROCARDIOGRAM TRACING: CPT | Performed by: NURSE PRACTITIONER

## 2018-03-29 PROCEDURE — C1725 CATH, TRANSLUMIN NON-LASER: HCPCS | Performed by: INTERNAL MEDICINE

## 2018-03-29 PROCEDURE — 84484 ASSAY OF TROPONIN QUANT: CPT | Performed by: NURSE PRACTITIONER

## 2018-03-29 PROCEDURE — 82962 GLUCOSE BLOOD TEST: CPT

## 2018-03-29 PROCEDURE — 25010000002 SULFUR HEXAFLUORIDE MICROSPH 60.7-25 MG RECONSTITUTED SUSPENSION: Performed by: INTERNAL MEDICINE

## 2018-03-29 DEVICE — XIENCE ALPINE EVEROLIMUS ELUTING CORONARY STENT SYSTEM 3.50 MM X 15 MM / RAPID-EXCHANGE
Type: IMPLANTABLE DEVICE | Status: FUNCTIONAL
Brand: XIENCE ALPINE

## 2018-03-29 RX ORDER — FENTANYL CITRATE 50 UG/ML
INJECTION, SOLUTION INTRAMUSCULAR; INTRAVENOUS AS NEEDED
Status: DISCONTINUED | OUTPATIENT
Start: 2018-03-29 | End: 2018-03-29 | Stop reason: HOSPADM

## 2018-03-29 RX ORDER — HYDROCODONE BITARTRATE AND ACETAMINOPHEN 5; 325 MG/1; MG/1
1 TABLET ORAL EVERY 4 HOURS PRN
Status: DISCONTINUED | OUTPATIENT
Start: 2018-03-29 | End: 2018-03-30 | Stop reason: HOSPADM

## 2018-03-29 RX ORDER — LIDOCAINE HYDROCHLORIDE 10 MG/ML
INJECTION, SOLUTION EPIDURAL; INFILTRATION; INTRACAUDAL; PERINEURAL AS NEEDED
Status: DISCONTINUED | OUTPATIENT
Start: 2018-03-29 | End: 2018-03-29 | Stop reason: HOSPADM

## 2018-03-29 RX ORDER — DIPHENHYDRAMINE HYDROCHLORIDE 50 MG/ML
25 INJECTION INTRAMUSCULAR; INTRAVENOUS EVERY 6 HOURS PRN
Status: DISCONTINUED | OUTPATIENT
Start: 2018-03-29 | End: 2018-03-30 | Stop reason: HOSPADM

## 2018-03-29 RX ORDER — MIDAZOLAM HYDROCHLORIDE 1 MG/ML
INJECTION INTRAMUSCULAR; INTRAVENOUS AS NEEDED
Status: DISCONTINUED | OUTPATIENT
Start: 2018-03-29 | End: 2018-03-29 | Stop reason: HOSPADM

## 2018-03-29 RX ORDER — ROSUVASTATIN CALCIUM 20 MG/1
20 TABLET, COATED ORAL NIGHTLY
Status: DISCONTINUED | OUTPATIENT
Start: 2018-03-29 | End: 2018-03-30 | Stop reason: HOSPADM

## 2018-03-29 RX ORDER — ACETAMINOPHEN 325 MG/1
650 TABLET ORAL EVERY 4 HOURS PRN
Status: DISCONTINUED | OUTPATIENT
Start: 2018-03-29 | End: 2018-03-30 | Stop reason: HOSPADM

## 2018-03-29 RX ADMIN — ACETAMINOPHEN 650 MG: 325 TABLET ORAL at 19:47

## 2018-03-29 RX ADMIN — PANTOPRAZOLE SODIUM 40 MG: 40 TABLET, DELAYED RELEASE ORAL at 05:20

## 2018-03-29 RX ADMIN — ONDANSETRON 4 MG: 2 INJECTION INTRAMUSCULAR; INTRAVENOUS at 19:46

## 2018-03-29 RX ADMIN — ROSUVASTATIN CALCIUM 20 MG: 20 TABLET, FILM COATED ORAL at 19:48

## 2018-03-29 RX ADMIN — SULFUR HEXAFLUORIDE 3 ML: KIT at 11:45

## 2018-03-29 RX ADMIN — INSULIN LISPRO 3 UNITS: 100 INJECTION, SOLUTION INTRAVENOUS; SUBCUTANEOUS at 09:18

## 2018-03-29 RX ADMIN — CLOPIDOGREL BISULFATE 75 MG: 75 TABLET ORAL at 09:18

## 2018-03-29 RX ADMIN — INSULIN LISPRO 3 UNITS: 100 INJECTION, SOLUTION INTRAVENOUS; SUBCUTANEOUS at 12:11

## 2018-03-29 RX ADMIN — ENOXAPARIN SODIUM 110 MG: 60 INJECTION SUBCUTANEOUS at 09:17

## 2018-03-29 RX ADMIN — HYDROCODONE BITARTRATE AND ACETAMINOPHEN 1 TABLET: 5; 325 TABLET ORAL at 16:34

## 2018-03-29 RX ADMIN — INSULIN LISPRO 3 UNITS: 100 INJECTION, SOLUTION INTRAVENOUS; SUBCUTANEOUS at 16:59

## 2018-03-29 RX ADMIN — INSULIN LISPRO 3 UNITS: 100 INJECTION, SOLUTION INTRAVENOUS; SUBCUTANEOUS at 20:25

## 2018-03-30 ENCOUNTER — DOCUMENTATION (OUTPATIENT)
Dept: CARDIAC REHAB | Facility: HOSPITAL | Age: 68
End: 2018-03-30

## 2018-03-30 VITALS
RESPIRATION RATE: 20 BRPM | BODY MASS INDEX: 33.46 KG/M2 | SYSTOLIC BLOOD PRESSURE: 131 MMHG | DIASTOLIC BLOOD PRESSURE: 83 MMHG | OXYGEN SATURATION: 88 % | TEMPERATURE: 98.3 F | HEART RATE: 73 BPM | HEIGHT: 72 IN | WEIGHT: 247 LBS

## 2018-03-30 LAB
ANION GAP SERPL CALCULATED.3IONS-SCNC: 12 MMOL/L (ref 3–11)
ARTICHOKE IGE QN: 134 MG/DL (ref 0–130)
BUN BLD-MCNC: 12 MG/DL (ref 9–23)
BUN/CREAT SERPL: 12 (ref 7–25)
CALCIUM SPEC-SCNC: 9.4 MG/DL (ref 8.7–10.4)
CHLORIDE SERPL-SCNC: 100 MMOL/L (ref 99–109)
CHOLEST SERPL-MCNC: 194 MG/DL (ref 0–200)
CO2 SERPL-SCNC: 26 MMOL/L (ref 20–31)
CREAT BLD-MCNC: 1 MG/DL (ref 0.6–1.3)
DEPRECATED RDW RBC AUTO: 41.3 FL (ref 37–54)
ERYTHROCYTE [DISTWIDTH] IN BLOOD BY AUTOMATED COUNT: 12.8 % (ref 11.3–14.5)
GFR SERPL CREATININE-BSD FRML MDRD: 75 ML/MIN/1.73
GLUCOSE BLD-MCNC: 165 MG/DL (ref 70–100)
GLUCOSE BLDC GLUCOMTR-MCNC: 182 MG/DL (ref 70–130)
HBA1C MFR BLD: 9.6 % (ref 4.8–5.6)
HCT VFR BLD AUTO: 41.7 % (ref 38.9–50.9)
HDLC SERPL-MCNC: 33 MG/DL (ref 40–60)
HGB BLD-MCNC: 14.3 G/DL (ref 13.1–17.5)
MCH RBC QN AUTO: 30.2 PG (ref 27–31)
MCHC RBC AUTO-ENTMCNC: 34.3 G/DL (ref 32–36)
MCV RBC AUTO: 88 FL (ref 80–99)
PLATELET # BLD AUTO: 170 10*3/MM3 (ref 150–450)
PMV BLD AUTO: 8.9 FL (ref 6–12)
POTASSIUM BLD-SCNC: 4 MMOL/L (ref 3.5–5.5)
RBC # BLD AUTO: 4.74 10*6/MM3 (ref 4.2–5.76)
SODIUM BLD-SCNC: 138 MMOL/L (ref 132–146)
TRIGL SERPL-MCNC: 343 MG/DL (ref 0–150)
TROPONIN I SERPL-MCNC: 0.42 NG/ML
WBC NRBC COR # BLD: 7.72 10*3/MM3 (ref 3.5–10.8)

## 2018-03-30 PROCEDURE — 83036 HEMOGLOBIN GLYCOSYLATED A1C: CPT | Performed by: INTERNAL MEDICINE

## 2018-03-30 PROCEDURE — 99232 SBSQ HOSP IP/OBS MODERATE 35: CPT | Performed by: NURSE PRACTITIONER

## 2018-03-30 PROCEDURE — 85027 COMPLETE CBC AUTOMATED: CPT | Performed by: INTERNAL MEDICINE

## 2018-03-30 PROCEDURE — 99238 HOSP IP/OBS DSCHRG MGMT 30/<: CPT | Performed by: INTERNAL MEDICINE

## 2018-03-30 PROCEDURE — 93010 ELECTROCARDIOGRAM REPORT: CPT | Performed by: INTERNAL MEDICINE

## 2018-03-30 PROCEDURE — 82962 GLUCOSE BLOOD TEST: CPT

## 2018-03-30 PROCEDURE — 80048 BASIC METABOLIC PNL TOTAL CA: CPT | Performed by: INTERNAL MEDICINE

## 2018-03-30 PROCEDURE — 80061 LIPID PANEL: CPT | Performed by: INTERNAL MEDICINE

## 2018-03-30 PROCEDURE — 93005 ELECTROCARDIOGRAM TRACING: CPT | Performed by: INTERNAL MEDICINE

## 2018-03-30 PROCEDURE — 84484 ASSAY OF TROPONIN QUANT: CPT | Performed by: NURSE PRACTITIONER

## 2018-03-30 RX ORDER — ASPIRIN 81 MG/1
81 TABLET ORAL DAILY
Qty: 30 TABLET | Refills: 1 | Status: SHIPPED | OUTPATIENT
Start: 2018-03-30

## 2018-03-30 RX ORDER — ROSUVASTATIN CALCIUM 20 MG/1
20 TABLET, COATED ORAL NIGHTLY
Qty: 30 TABLET | Refills: 11 | Status: SHIPPED | OUTPATIENT
Start: 2018-03-30

## 2018-03-30 RX ORDER — CLOPIDOGREL BISULFATE 75 MG/1
75 TABLET ORAL DAILY
Qty: 30 TABLET | Refills: 11 | Status: SHIPPED | OUTPATIENT
Start: 2018-03-31 | End: 2023-02-22

## 2018-03-30 RX ADMIN — DOCUSATE SODIUM 100 MG: 100 CAPSULE, LIQUID FILLED ORAL at 08:29

## 2018-03-30 RX ADMIN — PANTOPRAZOLE SODIUM 40 MG: 40 TABLET, DELAYED RELEASE ORAL at 06:40

## 2018-03-30 RX ADMIN — INSULIN LISPRO 2 UNITS: 100 INJECTION, SOLUTION INTRAVENOUS; SUBCUTANEOUS at 08:28

## 2018-03-30 RX ADMIN — CLOPIDOGREL BISULFATE 75 MG: 75 TABLET ORAL at 08:29

## 2018-04-02 NOTE — PAYOR COMM NOTE
"Maxime Sibley (67 y.o. Male)   Tracking id# [id number]  Bernie Traore RN, BSN  Phone # [phone]  Fax # [phone]    Date of Birth Social Security Number Address Home Phone MRN    1950  [address] [facility] [address] [phone] [id number]    S[employer]ian[employer] Marital Status          Quaker        Admission Date Admission Type Admitting Provider Attending Provider Department, Room/Bed    3/28/18 Emergency Bruna Sutton DO  [facility] 6A, N605/1    Discharge Date Discharge Disposition Discharge Destination        3/30/2018 Home or Self Care              Attending Provider:  (none)   Allergies:  Atorvastatin, Augmentin [Amoxicillin-pot Clavulanate], Cephalexin, Hydrocodone-acetaminophen, Penicillins    Isolation:  None   Infection:  None   Code Status:  Prior    Ht:  183 cm (72.05\")   Wt:  112 kg (247 lb)    Admission Cmt:  None   Principal Problem:  None                Active Insurance as of 3/28/2018     Primary Coverage     Payor Plan Insurance Group Employer/Plan Group    ANTHEM MEDICARE REPLACEMENT ANTH MEDICARE ADVANTAGE [id number]     Payor Plan Address Payor Plan Phone Number Effective From Effective To    [address] [phone] 2018     [place] [address]       Subscriber Name Subscriber Birth Date Member ID       MAXIME SIBLEY 1950 [id number]                 Emergency Contacts      (Rel.) Home Phone Work Phone Mobile Phone    Xochitl Sibley (Spouse) [phone] -- --               Discharge Summary      Bruna Sutton DO at 3/30/2018  1:12 PM              [facility] Medicine Services  DISCHARGE SUMMARY    Patient Name: Maxime Sibley  : 1950  MRN: [id number]    Date of Admission: 3/28/2018  Date of Discharge:  3/30/2018  Primary Care Physician: Georgi Lanza MD    Consults     Date and Time Order Name Status Description    3/28/2018 2134 Inpatient Cardiology Consult Completed         Hospital " Course     Presenting Problem:   Non-STEMI (non-ST elevated myocardial infarction) [I21.4]  Non-STEMI (non-ST elevated myocardial infarction) [I21.4]    Active Hospital Problems (** Indicates Principal Problem)    Diagnosis Date Noted   • Non-STEMI (non-ST elevated myocardial infarction) [I21.4] 03/28/2018   • Hypertension [I10] 03/28/2018   • Hyperlipidemia [E78.5] 03/28/2018   • Diabetes mellitus [E11.9] 03/28/2018      Resolved Hospital Problems    Diagnosis Date Noted Date Resolved   No resolved problems to display.          Hospital Course:  Maxime Rodriguez is a 67 y.o. male hx of HTN, DM2, and GERD presents with complaints of chest pressure and SOA with exertion, along with fatigue. Patient noted to have elevation in his troponin. Given his risk factors cardiology was consulted and patient was taken for LHC on 3/29/18 where he was found to have 90% LAD occlusion and 40% obtuse marginal. He received stent to the LAD. He was stable for discharge on 3/30. He should continue DAPT for minimum of one year. He will continue his home blood pressure and diabetic medications. All questions answered. He will f/u with cardiology in 4 weeks.    Procedure(s):  Left Heart Cath       Day of Discharge     HPI:   Feels good today. Still having a mild uncomfortable sensation in his chest, but is not worsening. Denies SOB.    Review of Systems  Gen- No fevers, chills  CV- No chest pain, palpitations  Resp- No cough, dyspnea  GI- No N/V/D, abd pain    Otherwise ROS is negative except as mentioned in the HPI.    Vital Signs:   Temp:  [98.3 °F (36.8 °C)-98.6 °F (37 °C)] 98.3 °F (36.8 °C)  Heart Rate:  [59-73] 73  Resp:  [18-20] 20  BP: (115-151)/(78-96) 131/83     Physical Exam:  Constitutional: No acute distress, awake, alert  HENT: NCAT, mucous membranes moist  Respiratory: Clear to auscultation bilaterally, respiratory effort normal   Cardiovascular: RRR, no murmurs, rubs, or gallops, palpable pedal pulses  bilaterally  Gastrointestinal: Positive bowel sounds, soft, nontender, nondistended  Musculoskeletal: No bilateral ankle edema  Psychiatric: Appropriate affect, cooperative  Neurologic: Oriented x 3, strength symmetric in all extremities, Cranial Nerves grossly intact to confrontation, speech clear  Skin: No rashes      Pertinent  and/or Most Recent Results       Results from last 7 days  Lab Units 03/30/18  0653 03/29/18  0627 03/28/18  1510   WBC 10*3/mm3 7.72 6.78 5.85   HEMOGLOBIN g/dL 14.3 14.2 13.8   HEMATOCRIT % 41.7 41.9 40.9   PLATELETS 10*3/mm3 170 174 176   SODIUM mmol/L 138 140 138   POTASSIUM mmol/L 4.0 4.3 4.0   CHLORIDE mmol/L 100 104 103   CO2 mmol/L 26.0 30.0 24.0   BUN mg/dL 12 18 15   CREATININE mg/dL 1.00 1.20 1.40*   GLUCOSE mg/dL 165* 231* 271*   CALCIUM mg/dL 9.4 9.3 9.0       Results from last 7 days  Lab Units 03/29/18  0627 03/28/18  1510   BILIRUBIN mg/dL 0.5 0.7   ALK PHOS U/L 82 95   ALT (SGPT) U/L 53* 56*   AST (SGOT) U/L 31 36*       Results from last 7 days  Lab Units 03/30/18  0653   CHOLESTEROL mg/dL 194   TRIGLYCERIDES mg/dL 343*   HDL CHOL mg/dL 33*       Results from last 7 days  Lab Units 03/30/18  0653 03/30/18  0030 03/29/18  1755 03/29/18  1137 03/29/18  0627 03/28/18  2308 03/28/18  1510   HEMOGLOBIN A1C % 9.60*  --   --   --   --   --   --    BNP pg/mL  --   --   --   --   --   --  10.0   TROPONIN I ng/mL  --  0.423* 0.313* 0.232* 0.237* 0.452*  --      Brief Urine Lab Results     None          Microbiology Results Abnormal     None          Imaging Results (all)     Procedure Component Value Units Date/Time    XR Chest 1 View [[id number]] Collected:  03/28/18 1619     Updated:  03/28/18 2104    Narrative:       EXAMINATION: XR CHEST 1 VW-03/28/2018:      INDICATION: Chest pain, triage protocol.      COMPARISON: 04/05/2017 portable chest.     FINDINGS: The heart and vasculature appear normal in size. Mild patchy  interstitial changes of the left lung appear a little  increased from the  prior study. There may be mild peribronchial thickening on today's exam  as well. No lung consolidation, effusion, or pneumothorax is seen.       Impression:       Nonspecific peribronchial thickening. No other evidence of  active chest disease.     D:  03/28/2018  E:  03/28/2018     This report was finalized on 3/28/2018 9:02 PM by DR. Khris Griffiths MD.                       Results for orders placed during the hospital encounter of 03/28/18   Adult Transthoracic Echo Complete W/ Cont if Necessary Per Protocol    Narrative · Left ventricular systolic function is normal. Estimated EF = 60%.  · The cardiac valves are anatomically and functionally normal.            Discharge Details      Maxime Rodriguez   Home Medication Instructions SONI:[id number]    Printed on:03/30/18 6099   Medication Information                      aliskiren (TEKTURNA) 150 MG tablet  Take 150 mg by mouth Daily.             aspirin 81 MG EC tablet  Take 1 tablet by mouth Daily.             clopidogrel (PLAVIX) 75 MG tablet  Take 1 tablet by mouth Daily.             nateglinide (STARLIX) 120 MG tablet  Take 120 mg by mouth 3 (Three) Times a Day Before Meals.             omeprazole (priLOSEC) 20 MG capsule  Take 20 mg by mouth Daily.             rosuvastatin (CRESTOR) 20 MG tablet  Take 1 tablet by mouth Every Night.                   Discharge Disposition:  Home or Self Care    Discharge Diet:  heart healthy, ADA    Discharge Activity:   as tolerated    Special Instructions:      Future Appointments  Date Time Provider Department Center   5/1/2018 1:15 PM Andrea Enriquez MD [facility] None       Additional Instructions for the Follow-ups that You Need to Schedule     Discharge Follow-up with Specialty: juan in [facility]; 1 Month    As directed      Specialty:  ju[facility]    Follow Up:  1 Month               Time Spent on Discharge:  25 minutes    Electronically signed by Bruna Sutton DO, 03/30/18, 3:39  PM.        Electronically signed by Bruna Sutton DO at 3/30/2018  3:44 PM       Discharge Order     Start     Ordered    03/30/18 1114  Discharge patient  Once     Expected Discharge Date:  03/30/18    Discharge Disposition:  Home or Self Care        03/30/18 1114

## 2018-04-03 ENCOUNTER — TELEPHONE (OUTPATIENT)
Dept: CARDIOLOGY | Facility: CLINIC | Age: 68
End: 2018-04-03

## 2018-04-03 NOTE — TELEPHONE ENCOUNTER
Patient called and wants to know if he can have a release for work stating he did not have a heart attack and there is no damage to his heart.  He also would like to know if it's ok to have sex.

## 2018-04-03 NOTE — TELEPHONE ENCOUNTER
If you read my progress note from his hospitalization it says not a NSTEMI. He may return to work 1 week after stent and may resume sexual activity at that time as well.

## 2018-05-01 ENCOUNTER — OFFICE VISIT (OUTPATIENT)
Dept: CARDIOLOGY | Facility: CLINIC | Age: 68
End: 2018-05-01

## 2018-05-01 VITALS
HEIGHT: 72 IN | WEIGHT: 243 LBS | SYSTOLIC BLOOD PRESSURE: 144 MMHG | BODY MASS INDEX: 32.91 KG/M2 | HEART RATE: 62 BPM | DIASTOLIC BLOOD PRESSURE: 84 MMHG

## 2018-05-01 DIAGNOSIS — I25.10 CORONARY ARTERY DISEASE INVOLVING NATIVE CORONARY ARTERY OF NATIVE HEART WITHOUT ANGINA PECTORIS: Primary | ICD-10-CM

## 2018-05-01 DIAGNOSIS — E78.00 PURE HYPERCHOLESTEROLEMIA: ICD-10-CM

## 2018-05-01 DIAGNOSIS — I10 ESSENTIAL HYPERTENSION: ICD-10-CM

## 2018-05-01 PROCEDURE — 99214 OFFICE O/P EST MOD 30 MIN: CPT | Performed by: INTERNAL MEDICINE

## 2018-05-01 RX ORDER — PIOGLITAZONEHYDROCHLORIDE 30 MG/1
30 TABLET ORAL DAILY
COMMUNITY

## 2018-05-01 RX ORDER — ALISKIREN HEMIFUMARATE AND HYDROCHLOROTHIAZIDE 300; 12.5 MG/1; MG/1
1 TABLET, FILM COATED ORAL DAILY
COMMUNITY

## 2018-06-15 ENCOUNTER — TELEPHONE (OUTPATIENT)
Dept: CARDIOLOGY | Facility: CLINIC | Age: 68
End: 2018-06-15

## 2018-06-15 NOTE — TELEPHONE ENCOUNTER
Called and advised of normal stress test results.  He needs copy of report for CDL.  Advised him to call back next week, hopefully will have official report in Epic (media) since was done in [place]. He will call back next week.

## 2018-08-13 ENCOUNTER — TELEPHONE (OUTPATIENT)
Dept: CARDIOLOGY | Facility: CLINIC | Age: 68
End: 2018-08-13

## 2018-08-13 NOTE — TELEPHONE ENCOUNTER
Patient called and says that he is having legs cramps on the Crestor.   Advised to hold for 1 week and if he got better to try taking it QOD to see what happens.  It that does not help to call and let us know.
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
## 2019-06-04 ENCOUNTER — OFFICE VISIT (OUTPATIENT)
Dept: CARDIOLOGY | Facility: CLINIC | Age: 69
End: 2019-06-04

## 2019-06-04 VITALS
HEIGHT: 72 IN | SYSTOLIC BLOOD PRESSURE: 112 MMHG | WEIGHT: 260 LBS | HEART RATE: 81 BPM | DIASTOLIC BLOOD PRESSURE: 64 MMHG | BODY MASS INDEX: 35.21 KG/M2 | OXYGEN SATURATION: 97 %

## 2019-06-04 DIAGNOSIS — I25.10 CORONARY ARTERY DISEASE INVOLVING NATIVE CORONARY ARTERY OF NATIVE HEART WITHOUT ANGINA PECTORIS: Primary | ICD-10-CM

## 2019-06-04 DIAGNOSIS — I10 ESSENTIAL HYPERTENSION: ICD-10-CM

## 2019-06-04 DIAGNOSIS — E78.5 DYSLIPIDEMIA: ICD-10-CM

## 2019-06-04 PROCEDURE — 99213 OFFICE O/P EST LOW 20 MIN: CPT | Performed by: NURSE PRACTITIONER

## 2020-02-27 ENCOUNTER — TELEPHONE (OUTPATIENT)
Dept: CARDIOLOGY | Facility: CLINIC | Age: 70
End: 2020-02-27

## 2020-03-11 ENCOUNTER — OFFICE VISIT (OUTPATIENT)
Dept: CARDIOLOGY | Facility: CLINIC | Age: 70
End: 2020-03-11

## 2020-03-11 VITALS
SYSTOLIC BLOOD PRESSURE: 116 MMHG | DIASTOLIC BLOOD PRESSURE: 78 MMHG | BODY MASS INDEX: 37.38 KG/M2 | HEIGHT: 72 IN | HEART RATE: 100 BPM | WEIGHT: 276 LBS

## 2020-03-11 DIAGNOSIS — I25.10 CORONARY ARTERY DISEASE INVOLVING NATIVE CORONARY ARTERY OF NATIVE HEART WITHOUT ANGINA PECTORIS: Primary | ICD-10-CM

## 2020-03-11 DIAGNOSIS — E78.5 DYSLIPIDEMIA: ICD-10-CM

## 2020-03-11 DIAGNOSIS — I10 ESSENTIAL HYPERTENSION: ICD-10-CM

## 2020-03-11 DIAGNOSIS — I21.4 NON-STEMI (NON-ST ELEVATED MYOCARDIAL INFARCTION) (HCC): Primary | ICD-10-CM

## 2020-03-11 DIAGNOSIS — Z02.4 ENCOUNTER FOR CDL (COMMERCIAL DRIVING LICENSE) EXAM: ICD-10-CM

## 2020-03-11 PROCEDURE — 99214 OFFICE O/P EST MOD 30 MIN: CPT | Performed by: NURSE PRACTITIONER
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
## 2020-03-27 ENCOUNTER — TELEPHONE (OUTPATIENT)
Dept: CARDIOLOGY | Facility: CLINIC | Age: 70
End: 2020-03-27

## 2020-08-03 ENCOUNTER — OFFICE VISIT (OUTPATIENT)
Dept: CARDIOLOGY | Facility: CLINIC | Age: 70
End: 2020-08-03

## 2020-08-03 VITALS
HEIGHT: 72 IN | DIASTOLIC BLOOD PRESSURE: 80 MMHG | SYSTOLIC BLOOD PRESSURE: 132 MMHG | WEIGHT: 279.6 LBS | HEART RATE: 74 BPM | BODY MASS INDEX: 37.87 KG/M2 | TEMPERATURE: 97.3 F | OXYGEN SATURATION: 93 %

## 2020-08-03 DIAGNOSIS — I25.10 CORONARY ARTERY DISEASE INVOLVING NATIVE CORONARY ARTERY OF NATIVE HEART WITHOUT ANGINA PECTORIS: Primary | ICD-10-CM

## 2020-08-03 DIAGNOSIS — I10 ESSENTIAL HYPERTENSION: ICD-10-CM

## 2020-08-03 DIAGNOSIS — E66.9 CLASS 2 OBESITY WITH BODY MASS INDEX (BMI) OF 37.0 TO 37.9 IN ADULT, UNSPECIFIED OBESITY TYPE, UNSPECIFIED WHETHER SERIOUS COMORBIDITY PRESENT: ICD-10-CM

## 2020-08-03 DIAGNOSIS — E78.2 MIXED HYPERLIPIDEMIA: ICD-10-CM

## 2020-08-03 PROBLEM — E66.812 CLASS 2 OBESITY IN ADULT: Status: ACTIVE | Noted: 2020-08-03

## 2020-08-03 PROCEDURE — 99214 OFFICE O/P EST MOD 30 MIN: CPT | Performed by: INTERNAL MEDICINE
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
## 2021-03-17 ENCOUNTER — OFFICE VISIT (OUTPATIENT)
Dept: CARDIOLOGY | Facility: CLINIC | Age: 71
End: 2021-03-17

## 2021-03-17 VITALS
BODY MASS INDEX: 38.06 KG/M2 | HEART RATE: 76 BPM | OXYGEN SATURATION: 94 % | WEIGHT: 281 LBS | HEIGHT: 72 IN | DIASTOLIC BLOOD PRESSURE: 72 MMHG | SYSTOLIC BLOOD PRESSURE: 130 MMHG

## 2021-03-17 DIAGNOSIS — I25.10 CORONARY ARTERY DISEASE INVOLVING NATIVE CORONARY ARTERY OF NATIVE HEART WITHOUT ANGINA PECTORIS: Primary | ICD-10-CM

## 2021-03-17 DIAGNOSIS — E78.2 MIXED HYPERLIPIDEMIA: ICD-10-CM

## 2021-03-17 DIAGNOSIS — I10 ESSENTIAL HYPERTENSION: ICD-10-CM

## 2021-03-17 PROCEDURE — 99213 OFFICE O/P EST LOW 20 MIN: CPT | Performed by: INTERNAL MEDICINE

## 2021-03-17 RX ORDER — TADALAFIL 20 MG/1
10 TABLET ORAL WEEKLY
COMMUNITY
End: 2023-02-22
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
## 2021-09-13 ENCOUNTER — OFFICE VISIT (OUTPATIENT)
Dept: CARDIOLOGY | Facility: CLINIC | Age: 71
End: 2021-09-13

## 2021-09-13 VITALS
WEIGHT: 287 LBS | OXYGEN SATURATION: 95 % | DIASTOLIC BLOOD PRESSURE: 70 MMHG | BODY MASS INDEX: 38.87 KG/M2 | SYSTOLIC BLOOD PRESSURE: 118 MMHG | HEIGHT: 72 IN | HEART RATE: 67 BPM

## 2021-09-13 DIAGNOSIS — I25.10 CORONARY ARTERY DISEASE INVOLVING NATIVE CORONARY ARTERY OF NATIVE HEART WITHOUT ANGINA PECTORIS: Primary | ICD-10-CM

## 2021-09-13 DIAGNOSIS — E78.2 MIXED HYPERLIPIDEMIA: ICD-10-CM

## 2021-09-13 DIAGNOSIS — R42 DIZZINESS: ICD-10-CM

## 2021-09-13 DIAGNOSIS — I10 ESSENTIAL HYPERTENSION: ICD-10-CM

## 2021-09-13 PROCEDURE — 99214 OFFICE O/P EST MOD 30 MIN: CPT | Performed by: INTERNAL MEDICINE

## 2021-11-16 ENCOUNTER — TELEPHONE (OUTPATIENT)
Dept: CARDIOLOGY | Facility: CLINIC | Age: 71
End: 2021-11-16

## 2022-01-12 DIAGNOSIS — I25.10 CORONARY ARTERY DISEASE INVOLVING NATIVE CORONARY ARTERY OF NATIVE HEART WITHOUT ANGINA PECTORIS: ICD-10-CM

## 2022-01-12 DIAGNOSIS — I21.4 NON-STEMI (NON-ST ELEVATED MYOCARDIAL INFARCTION): Primary | ICD-10-CM

## 2022-02-28 ENCOUNTER — HOSPITAL ENCOUNTER (OUTPATIENT)
Dept: CARDIOLOGY | Facility: HOSPITAL | Age: 72
Discharge: HOME | End: 2022-02-28
Admitting: NURSE PRACTITIONER

## 2022-02-28 DIAGNOSIS — I25.10 CORONARY ARTERY DISEASE INVOLVING NATIVE CORONARY ARTERY OF NATIVE HEART WITHOUT ANGINA PECTORIS: ICD-10-CM

## 2022-02-28 DIAGNOSIS — I21.4 NON-STEMI (NON-ST ELEVATED MYOCARDIAL INFARCTION): ICD-10-CM

## 2022-02-28 LAB
BH CV REST NUCLEAR ISOTOPE DOSE: 9.5 MCI
BH CV STRESS BP STAGE 1: NORMAL
BH CV STRESS BP STAGE 3: NORMAL
BH CV STRESS COMMENTS STAGE 1: NORMAL
BH CV STRESS DOSE REGADENOSON STAGE 1: 0.4
BH CV STRESS DURATION MIN STAGE 1: 1
BH CV STRESS DURATION MIN STAGE 2: 1
BH CV STRESS DURATION MIN STAGE 3: 1
BH CV STRESS DURATION MIN STAGE 4: 1
BH CV STRESS DURATION SEC STAGE 1: 10
BH CV STRESS DURATION SEC STAGE 2: 0
BH CV STRESS HR STAGE 1: 68
BH CV STRESS HR STAGE 2: 86
BH CV STRESS HR STAGE 3: 82
BH CV STRESS HR STAGE 4: 77
BH CV STRESS NUCLEAR ISOTOPE DOSE: 31.6 MCI
BH CV STRESS O2 STAGE 1: 94
BH CV STRESS O2 STAGE 2: 98
BH CV STRESS O2 STAGE 3: 97
BH CV STRESS O2 STAGE 4: 97
BH CV STRESS PROTOCOL 1: NORMAL
BH CV STRESS RECOVERY BP: NORMAL MMHG
BH CV STRESS RECOVERY HR: 67 BPM
BH CV STRESS RECOVERY O2: 97 %
BH CV STRESS STAGE 1: 1
BH CV STRESS STAGE 2: 2
BH CV STRESS STAGE 3: 3
BH CV STRESS STAGE 4: 4
LV EF NUC BP: 84 %
MAXIMAL PREDICTED HEART RATE: 149 BPM
PERCENT MAX PREDICTED HR: 61.74 %
STRESS BASELINE BP: NORMAL MMHG
STRESS BASELINE HR: 58 BPM
STRESS O2 SAT REST: 96 %
STRESS PERCENT HR: 73 %
STRESS POST ESTIMATED WORKLOAD: 1 METS
STRESS POST EXERCISE DUR MIN: 4 MIN
STRESS POST EXERCISE DUR SEC: 0 SEC
STRESS POST O2 SAT PEAK: 97 %
STRESS POST PEAK BP: NORMAL MMHG
STRESS POST PEAK HR: 92 BPM
STRESS TARGET HR: 127 BPM

## 2022-02-28 PROCEDURE — 78452 HT MUSCLE IMAGE SPECT MULT: CPT | Performed by: INTERNAL MEDICINE

## 2022-02-28 PROCEDURE — 93018 CV STRESS TEST I&R ONLY: CPT | Performed by: INTERNAL MEDICINE

## 2022-02-28 PROCEDURE — 0 TECHNETIUM SESTAMIBI: Performed by: NURSE PRACTITIONER

## 2022-02-28 PROCEDURE — 25010000002 REGADENOSON 0.4 MG/5ML SOLUTION: Performed by: NURSE PRACTITIONER

## 2022-02-28 PROCEDURE — A9500 TC99M SESTAMIBI: HCPCS | Performed by: NURSE PRACTITIONER

## 2022-02-28 PROCEDURE — 93017 CV STRESS TEST TRACING ONLY: CPT

## 2022-02-28 PROCEDURE — 78452 HT MUSCLE IMAGE SPECT MULT: CPT

## 2022-02-28 RX ADMIN — REGADENOSON 0.4 MG: 0.08 INJECTION, SOLUTION INTRAVENOUS at 10:52

## 2022-02-28 RX ADMIN — TECHNETIUM TC 99M SESTAMIBI 1 DOSE: 1 INJECTION INTRAVENOUS at 08:58

## 2022-02-28 RX ADMIN — TECHNETIUM TC 99M SESTAMIBI 1 DOSE: 1 INJECTION INTRAVENOUS at 10:50

## 2022-03-01 ENCOUNTER — TELEPHONE (OUTPATIENT)
Dept: CARDIOLOGY | Facility: CLINIC | Age: 72
End: 2022-03-01

## 2022-03-01 NOTE — TELEPHONE ENCOUNTER
-Spoke with wife, advised of below    ---- Message from KACEY Christopher sent at 3/1/2022  9:57 AM EST -----  Please call patient let him know his stress test was normal.

## 2022-03-23 ENCOUNTER — OFFICE VISIT (OUTPATIENT)
Dept: CARDIOLOGY | Facility: CLINIC | Age: 72
End: 2022-03-23

## 2022-03-23 VITALS
DIASTOLIC BLOOD PRESSURE: 64 MMHG | OXYGEN SATURATION: 99 % | WEIGHT: 283 LBS | BODY MASS INDEX: 38.33 KG/M2 | SYSTOLIC BLOOD PRESSURE: 150 MMHG | HEART RATE: 79 BPM | HEIGHT: 72 IN

## 2022-03-23 DIAGNOSIS — E78.2 MIXED HYPERLIPIDEMIA: ICD-10-CM

## 2022-03-23 DIAGNOSIS — I10 PRIMARY HYPERTENSION: ICD-10-CM

## 2022-03-23 DIAGNOSIS — I25.10 CORONARY ARTERY DISEASE INVOLVING NATIVE CORONARY ARTERY OF NATIVE HEART WITHOUT ANGINA PECTORIS: Primary | ICD-10-CM

## 2022-03-23 PROCEDURE — 99213 OFFICE O/P EST LOW 20 MIN: CPT | Performed by: INTERNAL MEDICINE
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
## 2023-02-22 ENCOUNTER — OFFICE VISIT (OUTPATIENT)
Dept: CARDIOLOGY | Facility: CLINIC | Age: 73
End: 2023-02-22
Payer: MEDICARE

## 2023-02-22 VITALS
HEIGHT: 72 IN | HEART RATE: 68 BPM | WEIGHT: 288.2 LBS | BODY MASS INDEX: 39.04 KG/M2 | DIASTOLIC BLOOD PRESSURE: 78 MMHG | OXYGEN SATURATION: 95 % | SYSTOLIC BLOOD PRESSURE: 130 MMHG

## 2023-02-22 DIAGNOSIS — I10 PRIMARY HYPERTENSION: ICD-10-CM

## 2023-02-22 DIAGNOSIS — I25.10 CORONARY ARTERY DISEASE INVOLVING NATIVE CORONARY ARTERY OF NATIVE HEART WITHOUT ANGINA PECTORIS: Primary | ICD-10-CM

## 2023-02-22 DIAGNOSIS — E78.2 MIXED HYPERLIPIDEMIA: ICD-10-CM

## 2023-02-22 PROCEDURE — 99214 OFFICE O/P EST MOD 30 MIN: CPT | Performed by: INTERNAL MEDICINE
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
## 2023-10-04 ENCOUNTER — OFFICE VISIT (OUTPATIENT)
Dept: NEUROSURGERY | Facility: CLINIC | Age: 73
End: 2023-10-04
Payer: MEDICARE

## 2023-10-04 VITALS — BODY MASS INDEX: 38.33 KG/M2 | WEIGHT: 283 LBS | HEIGHT: 72 IN | TEMPERATURE: 97.5 F

## 2023-10-04 DIAGNOSIS — Z98.1 HISTORY OF FUSION OF CERVICAL SPINE: ICD-10-CM

## 2023-10-04 DIAGNOSIS — S16.1XXA STRAIN OF NECK MUSCLE, INITIAL ENCOUNTER: ICD-10-CM

## 2023-10-04 DIAGNOSIS — S16.1XXA CERVICAL STRAIN, INITIAL ENCOUNTER: Primary | ICD-10-CM

## 2023-10-04 RX ORDER — TRAMADOL HYDROCHLORIDE 50 MG/1
TABLET ORAL
COMMUNITY
Start: 2023-08-31

## 2023-10-04 RX ORDER — HYDROCHLOROTHIAZIDE 12.5 MG/1
TABLET ORAL
COMMUNITY
Start: 2023-09-19

## 2023-10-04 RX ORDER — METHYLPREDNISOLONE 4 MG/1
TABLET ORAL
COMMUNITY
Start: 2023-09-03

## 2023-10-04 RX ORDER — DICLOFENAC SODIUM 75 MG/1
TABLET, DELAYED RELEASE ORAL
COMMUNITY
Start: 2023-08-21

## 2023-10-04 RX ORDER — CELECOXIB 200 MG/1
1 CAPSULE ORAL DAILY
COMMUNITY
Start: 2023-09-14

## 2023-10-04 RX ORDER — CYCLOBENZAPRINE HCL 5 MG
TABLET ORAL
COMMUNITY
Start: 2023-09-03

## 2023-10-04 RX ORDER — ALISKIREN 300 MG/1
TABLET, FILM COATED ORAL
COMMUNITY
Start: 2023-09-14

## 2023-10-04 RX ORDER — OXYCODONE AND ACETAMINOPHEN 7.5; 325 MG/1; MG/1
TABLET ORAL
COMMUNITY
Start: 2023-06-19

## 2023-10-04 NOTE — PROGRESS NOTES
Patient: Maxime Rodriguez  : 1950    Primary Care Provider: Georgi Lanza MD    Requesting Provider: As above        History    Chief Complaint: Neck and bilateral shoulder pain.    History of Present Illness: Mr. Rodriguez is a 73-year-old  and former  at [employer] who is known to my service.  On 2011 he underwent ACDF at C6-7.  He has generally done well.  On 2023 he was pulling on a garden hose when he fell backwards and flipped over.  He developed increasing neck stiffness and pain that has involved his shoulders.  He has had soreness in his forearms.  He has had a little tingling but no actual numbness.  He was treated with a Medrol Dosepak and felt quite good but his symptoms recurred thereafter.  He was also treated with diclofenac, Flexeril, and tramadol.  At one point he was seen in the emergency room and given a steroid shot and another Medrol Dosepak.  He has grudgingly taken some pain medication.  He is now on Celebrex.  His symptoms seem to be worse at night.  His wife passed away several weeks ago.    Review of Systems   Constitutional:  Negative for activity change, appetite change, chills, diaphoresis, fatigue, fever and unexpected weight change.   HENT:  Positive for hearing loss and tinnitus. Negative for congestion, dental problem, drooling, ear discharge, ear pain, facial swelling, mouth sores, nosebleeds, postnasal drip, rhinorrhea, sinus pressure, sneezing, sore throat, trouble swallowing and voice change.    Eyes:  Negative for photophobia, pain, discharge, redness, itching and visual disturbance.   Respiratory:  Negative for apnea, cough, choking, chest tightness, shortness of breath, wheezing and stridor.    Cardiovascular:  Negative for chest pain, palpitations and leg swelling.   Gastrointestinal:  Negative for abdominal distention, abdominal pain, anal bleeding, blood in stool, constipation, diarrhea, nausea, rectal pain and vomiting.   Endocrine: Negative for  "cold intolerance, heat intolerance, polydipsia, polyphagia and polyuria.   Genitourinary:  Negative for decreased urine volume, difficulty urinating, dysuria, enuresis, flank pain, frequency, genital sores, hematuria and urgency.   Musculoskeletal:  Positive for neck pain. Negative for arthralgias, back pain, gait problem, joint swelling, myalgias and neck stiffness.   Skin:  Negative for color change, pallor, rash and wound.   Allergic/Immunologic: Negative for environmental allergies, food allergies and immunocompromised state.   Neurological:  Positive for numbness. Negative for dizziness, tremors, seizures, syncope, facial asymmetry, speech difficulty, weakness, light-headedness and headaches.   Hematological:  Negative for adenopathy. Does not bruise/bleed easily.   Psychiatric/Behavioral:  Negative for agitation, behavioral problems, confusion, decreased concentration, dysphoric mood, hallucinations, self-injury, sleep disturbance and suicidal ideas. The patient is not nervous/anxious and is not hyperactive.    All other systems reviewed and are negative.    The patient's past medical history, past surgical history, family history, and social history have been reviewed at length in the electronic medical record.      Physical Exam:   Temp 97.5 °F (36.4 °C) (Infrared)   Ht 182.9 cm (72\")   Wt 128 kg (283 lb)   BMI 38.38 kg/m²   CONSTITUTIONAL: Patient is well-nourished, pleasant and appears stated age.  MUSCULOSKELETAL:  Neck tenderness to palpation is not observed.   ROM in the neck is mildly limited in all directions.  NEUROLOGICAL:  Orientation, memory, attention span, language function, and cognition have been examined and are intact.  Strength is intact in the upper and lower extremities to direct testing.  Muscle tone is normal throughout.  Station and gait are normal.  Sensation is intact to light touch testing throughout.  Deep tendon reflexes are 1+ and symmetrical.  Cornelia's Sign is negative " bilaterally. No clonus is elicited.  Coordination is intact.      Medical Decision Making    Data Review:   (All imaging studies were personally reviewed unless stated otherwise)  MRI of the cervical spine dated 8/25/2023 demonstrates his C6-7 construct.  He has some diffuse spondylosis with moderate canal narrowing at multiple levels.  This is probably most significant at C3-4.  There is no tasha cord compromise however.  There is no signal change within the cord.    Diagnosis:   The patient harbors a cervical strain and perhaps a flareup of his underlying degenerative changes.  I do not see evidence of a radiculopathy or certainly a myelopathy.    Treatment Options:   He will continue his Celebrex.  I have referred him for physical therapy.  He will follow-up in our clinic in several weeks to check on his progress.  Presently, he does not require surgical intervention.       Diagnosis Plan   1. Cervical strain, initial encounter        2. History of fusion of cervical spine            Scribed for Rodolfo Tee MD by Kelle Macedo CMA. 10/4/2023 14:02 EDT    I, Dr. Tee, personally performed the services described in the documentation, as scribed in my presence, and it is both accurate and complete.

## 2023-10-04 NOTE — LETTER
October 4, 2023     Patient: Maxime Rodriguez   YOB: 1950   Date of Visit: 10/4/2023       To Whom It May Concern:    It is my medical opinion that Maxime Rodriguez should remain out of work until next scheduled follow up .    Next appointment: ___________________           Sincerely,        Rodolfo Tee MD    CC:   No Recipients

## 2023-10-16 ENCOUNTER — TELEPHONE (OUTPATIENT)
Dept: NEUROSURGERY | Facility: CLINIC | Age: 73
End: 2023-10-16
Payer: MEDICARE

## 2023-10-16 NOTE — TELEPHONE ENCOUNTER
Provider:  Randal  Surgery/Procedure:  ACDF C6-7   Surgery/Procedure Date:  9/19/2011  Last visit:   10/4/23  Next visit: 11/15/23     Reason for call:  Mr. Rodriguez is still having quite a bit of pain in his neck and arm, as well as some muscle stiffness. He has been going to therapy but would like to follow up in office sooner. He does continue to take Celebrex daily, and I have asked him to also resume taking tramadol and Flexeril that he was given by his PCP.

## 2023-10-18 NOTE — TELEPHONE ENCOUNTER
Patient Education     Preventing Vaginitis     Use mild, unscented soap when you bathe or shower to avoid irritating your vagina.    Vaginitis is irritation or infection of the vagina or vulva (the outside opening of the vagina). Vaginitis can be caused by bacteria, viruses, parasites, or yeast. Chemicals (such as in perfumes or soaps or in spermicides) can sometimes be a cause. Vaginitis can be caused by hormone changes in pregnancy or with menopause. You can help prevent vaginitis. Follow the tips below. And see your healthcare provider if you have any symptoms.  Hygiene    Avoid chemicals. Do not use vaginal sprays. Do not use scented toilet paper or tampons that are scented. Sprays and scents have chemicals that can irritate your vagina.    Do not douche unless you are told to by your healthcare provider. Douching is rarely needed. And it upsets the normal balance in the vagina.    Wash yourself well. Wash the outer vaginal area (vulva) every day with mild, unscented soap. Keep it as dry as possible.    Wipe correctly. Make sure to wipe from front to back after a bowel movement. This helps keep from spreading bacteria from your anus to your vagina.    Change your tampon often. During your period, make sure to change your tampon as often as directed on the package. This allows the normal flow of vaginal discharge and blood.  Lifestyle    Limit your number of sexual partners. The more partners you have, the greater your risk of infection. Using condoms helps reduce your risk.    Get enough sleep. Sleep helps keep your body s immune system healthy. This helps you fight infection.    Lose weight, if needed. Excess weight can reduce air circulation around your vagina. This can increase your risk of infection.    Exercise regularly. Regular activity helps keep your body healthy.    Take antibiotics only as directed. Antibiotics can change the normal chemical balance in the vagina.    Clothing    Don t sit in wet  Tried to call Pt to move his appt sooner but no answer on both phones listed. Home phone just keeps on jean-claude ESPINOZA to call back @ cellphone#   clothes. Yeast thrives when it s warm and damp.    Don t wear tight pants. And don t wear tights, leggings, or hose without a cotton crotch. These types of clothing trap warmth and moisture.    Wear cotton underwear. Cotton lets air circulate around the vagina.  Symptoms of vaginitis    Irritation, swelling, or itching of the genital area    Vaginal discharge    Bad vaginal odor    Pain or burning during urination   Date Last Reviewed: 12/1/2016 2000-2019 [employer]. [address] [place] [address], [place] [address]. All rights reserved. This information is not intended as a substitute for professional medical care. Always follow your healthcare professional's instructions.

## 2023-10-25 ENCOUNTER — OFFICE VISIT (OUTPATIENT)
Dept: NEUROSURGERY | Facility: CLINIC | Age: 73
End: 2023-10-25
Payer: MEDICARE

## 2023-10-25 VITALS
DIASTOLIC BLOOD PRESSURE: 88 MMHG | WEIGHT: 285 LBS | BODY MASS INDEX: 38.6 KG/M2 | SYSTOLIC BLOOD PRESSURE: 155 MMHG | TEMPERATURE: 97.5 F | HEIGHT: 72 IN

## 2023-10-25 DIAGNOSIS — S16.1XXA CERVICAL STRAIN, INITIAL ENCOUNTER: Primary | ICD-10-CM

## 2023-10-25 DIAGNOSIS — Z98.1 HISTORY OF FUSION OF CERVICAL SPINE: ICD-10-CM

## 2023-10-25 DIAGNOSIS — S16.1XXA STRAIN OF NECK MUSCLE, INITIAL ENCOUNTER: ICD-10-CM

## 2023-10-25 RX ORDER — GABAPENTIN 300 MG/1
CAPSULE ORAL
Qty: 90 CAPSULE | Refills: 1 | Status: SHIPPED | OUTPATIENT
Start: 2023-10-25

## 2023-10-25 NOTE — PROGRESS NOTES
Patient: Maxime Rodriguez  : 1950  Chart #: [id number]    Date of Service: 10/25/2023    CHIEF COMPLAINT: Neck and bilateral shoulder pain    History of Present Illness Mr. Rodriguez is seen in follow-up.  He is a 73-year-old former  at [employer] who is well-known to Dr. Tee's service.  He underwent ACDF at C6-7 in .  In general he has done well.  More recently in August of this year he was pulling on a garden hose when he fell backwards and flipped over.  He developed increased neck stiffness and pain that has involved his shoulders.  He has had soreness in his forearms.  He complains of a little tingling but no actual numbness.  He was treated with a steroid pack and felt much better but his symptoms recurred thereafter.  He was also treated with diclofenac, Flexeril, and tramadol.  At one point he was seen in the emergency room.  He is now on Celebrex.  His symptoms seem to be worse at night.  His wife passed away several weeks ago.  MRI demonstrates diffuse spondylosis with moderate canal narrowing at multiple levels. Dr Tee saw him and did not feel like he was a surgical candidate. He referred him for more therapy. Overall, patient feels worse. His arms ache constantly. He is increasingly frustrated. They have not tried cervical traction in therapy.       Past Medical History:   Diagnosis Date    Arthritis     Cervical disc disorder     Coronary artery disease involving native coronary artery of native heart without angina pectoris     CTS (carpal tunnel syndrome)     Diabetes mellitus     Hyperlipidemia     Hypertension     Non-STEMI (non-ST elevated myocardial infarction)     Peripheral neuropathy          Current Outpatient Medications:     aliskiren (TEKTURNA) 300 MG tablet, , Disp: , Rfl:     aspirin 81 MG EC tablet, Take 1 tablet by mouth Daily., Disp: 30 tablet, Rfl: 1    celecoxib (CeleBREX) 200 MG capsule, Take 1 capsule by mouth Daily., Disp: , Rfl:     cyclobenzaprine (FLEXERIL) 5 MG  tablet, TAKE 1 TABLET ORAL ROUTE 3 TIMES PER DAY AS NEEDED, Disp: , Rfl:     gabapentin (NEURONTIN) 300 MG capsule, One tab PO qhs, then one tab BID, then one tab TID, Disp: 90 capsule, Rfl: 1    hydroCHLOROthiazide (HYDRODIURIL) 12.5 MG tablet, , Disp: , Rfl:     methylPREDNISolone (MEDROL) 4 MG dose pack, TAKE 1 DOSE PACK ORAL ROUTE PER PACKAGE DIRECTIONS, Disp: , Rfl:     oxyCODONE-acetaminophen (PERCOCET) 7.5-325 MG per tablet, TAKE 1 TABLET BY MOUTH 2 TIMES A DAY AS NEEDED FOR SEVERE PAIN, Disp: , Rfl:     pioglitazone (ACTOS) 45 MG tablet, Take 1 tablet by mouth Daily., Disp: , Rfl:     rosuvastatin (CRESTOR) 20 MG tablet, Take 1 tablet by mouth Every Night. (Patient taking differently: Take 0.5 tablets by mouth Every Night.), Disp: 30 tablet, Rfl: 11    SITagliptin (JANUVIA) 100 MG tablet, Take 1 tablet by mouth Daily., Disp: , Rfl:     traMADol (ULTRAM) 50 MG tablet, TAKE 1 TABLET TWICE DAILY AS NEEDED FOR PAIN WITH FOOD, Disp: , Rfl:     Past Surgical History:   Procedure Laterality Date    CARDIAC CATHETERIZATION N/A 2018    Procedure: Left Heart Cath;  Surgeon: Andrea Enriquez MD;  Location: [facility] CATH INVASIVE LOCATION;  Service: Cardiovascular    CAROTID STENT      CARPAL TUNNEL RELEASE      CERVICAL DISCECTOMY LAMINECTOMY DECOMPRESSION POSTERIOR FUSION WITH INSTRUMENTATION      ACDF C6-7  11    CORONARY STENT PLACEMENT  2018    EPIDURAL BLOCK      HEMORRHOIDECTOMY      NECK SURGERY      SPINAL FUSION      TONSILLECTOMY         Social History     Socioeconomic History    Marital status:    Tobacco Use    Smoking status: Former     Packs/day: 1.00     Years: 10.00     Additional pack years: 0.00     Total pack years: 10.00     Types: Cigarettes     Quit date: 1978     Years since quittin.8    Smokeless tobacco: Never   Substance and Sexual Activity    Alcohol use: Not Currently     Comment: occasional    Drug use: No    Sexual activity: Yes     Partners: Female  "    Birth control/protection: None         Review of Systems    Objective   Vital Signs: Blood pressure 155/88, temperature 97.5 °F (36.4 °C), temperature source Infrared, height 182.9 cm (72\"), weight 129 kg (285 lb).  Physical Exam  Vitals and nursing note reviewed.   Constitutional:       General: He is not in acute distress.     Appearance: He is well-developed.   HENT:      Head: Normocephalic and atraumatic.   Psychiatric:         Behavior: Behavior normal.         Thought Content: Thought content normal.     Musculoskeletal:     Strength is intact in upper and lower extremities to direct testing.     Station and gait are normal.  Neurologic:     Muscle tone is normal throughout.     Sensation is intact to light touch throughout.     Patient is oriented to person, place, and time.         Independent review of radiographic imaging: MRI of the cervical spine dated 8/25/2023 demonstrates postoperative changes at C6-7.  There is diffuse spondylosis with moderate narrowing at multiple levels.  Spondylosis is most pronounced at C3-4 where there is central and bilateral foraminal narrowing.  At C4-5 there is right greater than left recess narrowing.    Assessment & Plan   Diagnosis:   Cervical spondylosis with possible radiculopathy  Cervical strain    Medical Decision Making: I am going to refer Mr. Rodriguez to Dr. Jama for consideration of a cervical epidural injection for both diagnostic and therapeutic measures. I have prescribed Neurontin and we discussed taking on the in the morning and two at night. If injections are not helpful then I would like to see him back with EMG/NCV studies of the bilateral upper extremities.            Diagnoses and all orders for this visit:    1. Cervical strain, initial encounter (Primary)  -     gabapentin (NEURONTIN) 300 MG capsule; One tab PO qhs, then one tab BID, then one tab TID  Dispense: 90 capsule; Refill: 1  -     Ambulatory Referral to Physical Therapy (cervical " traction)  -     Ambulatory Referral to Pain Management    2. Strain of neck muscle, initial encounter  -     gabapentin (NEURONTIN) 300 MG capsule; One tab PO qhs, then one tab BID, then one tab TID  Dispense: 90 capsule; Refill: 1  -     Ambulatory Referral to Physical Therapy (cervical traction)  -     Ambulatory Referral to Pain Management    3. History of fusion of cervical spine  -     gabapentin (NEURONTIN) 300 MG capsule; One tab PO qhs, then one tab BID, then one tab TID  Dispense: 90 capsule; Refill: 1  -     Ambulatory Referral to Physical Therapy (cervical traction)  -     Ambulatory Referral to Pain Management                                  Annita Patino PA-C  Patient Care Team:  Brad Huang MD as PCP - General (Internal Medicine)  Georgi Lanza MD as Referring Physician (Internal Medicine)

## 2023-11-04 PROBLEM — S16.1XXA CERVICAL STRAIN: Status: ACTIVE | Noted: 2023-11-04

## 2023-11-04 PROBLEM — M47.812 CERVICAL SPONDYLOSIS WITHOUT MYELOPATHY: Status: ACTIVE | Noted: 2023-11-04

## 2023-11-04 PROBLEM — Z98.1 HISTORY OF FUSION OF CERVICAL SPINE: Status: ACTIVE | Noted: 2023-11-04

## 2023-11-04 PROBLEM — M50.30 DDD (DEGENERATIVE DISC DISEASE), CERVICAL: Status: ACTIVE | Noted: 2023-11-04

## 2023-11-04 NOTE — PROGRESS NOTES
"Chief Complaint: \"Pain in my arms. Neck and shoulder pain\"         History of Present Illness:   Patient: Mr. Maxime Rodriguez, 73 y.o. male   Referring Physician: Annita Patino PA-C  Reason for Referral: Consultation for chronic intractable neck and upper extremity pain.   Pain History: Patient reports a longstanding history of chronic intractable cervical spine pain. Maxime Rodriguez underwent ACDF at C6-C7 in 2011 by Dr. Tee. He has done well after surgery.  In August of 2023, he was pulling on a garden hose and fell backwards and flipped over.  He developed stiffness in his neck and pain in his neck and shoulders. He also had soreness in his forearms. His symptoms seem to be worse at night. His arms ache constantly. He is increasingly frustrated. He had an emergency room visit due to the severity of his pain. He was treated with a steroid pack with modest temporary improvement with recurrence thereafter. He has been treated with diclofenac, Flexeril, tramadol, Celebrex, gabapentin, physical therapy (made pain worse) etc.  MRI of the cervical spine on 08/25/2067 fusion.  Evidence of prior discectomy and fusion at C6-C7. Multilevel facet arthropathy. C3-C4: Diffuse annular bulge. Facet arthropathy. Mild canal stenosis and mild bilateral foraminal stenosis. At C5-C6: Endplate hypertrophy eccentric to the right. Facet arthropathy. Lateral recess stenosis. Mild to moderate right foraminal stenosis.  Maxime Rodriguez underwent neurosurgical consultation with Dr Tee and was found not to be a surgical candidate. Maxime Rodriguez underwent follow-up neurosurgical consultation with Annita Patino PA-C on 10/25/2023, and was found not to be a surgical candidate. Pain has progressed in intensity over the past months.  Maxime Rodriguez has failed to obtain pain relief with conservative measures for more than 3 months including oral analgesics, opioids, topical analgesics, ice, heat, physical therapy (ongoing, last visit " on 11/2), physical therapist directed home exercise program HEP (ongoing), therapeutic massage, to name a few  Pain Description: Constant posterior neck, shoulder and RT>LT upper extremity pain with intermittent exacerbation, described as aching, dull, shooting, numbing, and tingling sensation.   Radiation of Pain: The pain radiates into the shoulders and down into his arms  Pain intensity today: 8/10   Average pain intensity last week: 8/10  Pain intensity ranges from: 6/10 to 10/10  Aggravating factors: Pain increases with lifting, pushing, pulling, using his arms  Alleviating factors: Pain decreases with NONE  Associated Symptoms:   Patient reports pain and weakness but denies numbness in the upper extremities.   Patient denies any new bladder or bowel problems.   Patient denies difficulties with his balance or recent falls.   Pain interferes with general activities and affects patient's quality of life  Pain interferes with sleep causing sleep fragmentation   Muscle spasms: No  Stiffness: Neck    Review of previous therapies and additional medical records:  Maxime Rodriguez has already failed the following measures, including:   Conservative Measures: Oral analgesics, opioids, topical analgesics, ice, heat, physical therapy (ongoing, last visit on 11/2), physical therapist directed home exercise program HEP (ongoing), therapeutic massage  Interventional Measures: None  Surgical Measures: Hx ACDF C6-C7 in 2011 by Dr. Tee.  Maxime Rodriguez underwent neurosurgical consultation with Annita Patino PA-C on 10/25/2023, and was found not to be a surgical candidate.  Maxime Rodriguez presents with significant comorbidities including coronary artery disease, diabetes mellitus, hyperlipidemia, hypertension, peripheral neuropathy, obesity, on aspirin 81 mg  In terms of current analgesics, Maxime Rodriguez takes: celecoxib, cyclobenzaprine, gabapentin  I have reviewed [place] Report consistent with medication  reconciliation.  SOAPP/ORT: Low Risk     PHQ-2 Depression Screening  Little interest or pleasure in doing things? 0-->not at all   Feeling down, depressed, or hopeless? 0-->not at all   PHQ-2 Total Score 0     Pain Self-Efficacy Questionnaire (PSEQ)  ITEM 11-07 2023        I can enjoy things despite the pain. 1        I can do most of the household chores (tidying up, washing dishes, etc), despite the pain. 2        I can socialize with my friends or family members as often as I used to do, despite the pain. 2        I can cope with my pain in most situations. 1        I can do some form of work, despite the pain (includes housework, paid, and unpaid work). 2        I can still do many of the things I enjoy doing, such as hobbies or leisure activity despite pain. 1        I can cope with my pain without medications. 0        I can accomplish most of my goals in life despite the pain. 1        I can live in a normal lifestyle, despite the pain. 0        I can gradually become more active, despite the pain. 0        TOTAL SCORE 10/60            Global Pain Scale 11-07 2023          Pain 20          Feelings 10          Clinical outcomes 14          Activities 10          GPS Total: 54            NECK PAIN DISABILITY INDEX QUESTIONNAIRE  DATE 11-07 2023           Pain intensity  0: No pain  1: Mild  2: Moderate  3: Fairly severe  4: very severe  5: Worst imaginable 3            Personal Care   0: I can look after myself normally without causing extra pain.  1: I can look after myself normally, but it causes extra pain.  2: It is painful to look after myself and I am slow and careful.  3: I need some help, but manage most of my personal care.  4: I need help every day in most aspects of self-care.  5: I do not get dressed; I wash with difficulty and stay in bed. 1           Lifting  0: I can lift heavy weights without extra pain.  1: I can lift heavy weights, but it gives extra pain.  2: Pain prevents me from lifting  heavy weights off the floor but I can manage if they are conveniently positioned, for example, on a table.  3: Pain prevents me from lifting heavy weights, but I can manage light to medium weights if they are conveniently positioned.  4: I can lift very light weights.  5: I cannot lift or carry anything at all. 3           Reading  0: I can read as much as I want to with no pain in my neck.  1: I can read as much as I want to with slight pain in my neck.  2: I can read as much as I want to with moderate pain in my neck.  3: I cannot read as much as I want because of moderate pain in my neck.  4: I cannot read as much as I want because of severe pain in my neck.  5: I cannot read at all.  2           Headaches  0: I have no headaches at all.  1: I have slight headaches which come infrequently.  2: I have moderate headaches which come infrequently.  3: I have moderate headaches which come frequently.  4: I have severe headaches which come frequently.  5: I have headaches almost all the time.  1           Concentration  0: I can concentrate fully when I want to with no difficulty.  1: I can concentrate fully when I want to with slight difficulty.  2: I have a fair degree of difficulty in concentrating when I want to.  3: I have a lot of difficulty in concentrating when I want to.  4: I have a great deal of difficulty in concentrating when I want to.  5: I cannot concentrate at all.  0           Work  0: I can do as much work as I want to.  1: I can only do my usual work, but no more.  2: I can do most of my usual work, but no more.  3: I cannot do my usual work.  4: I can hardly do any work at all.  5: I cannot do any work at all.  4           Driving  0: I can drive my car without any neck pain.  1: I can drive my car as long as I want with slight pain in my neck.  2: I can drive my car as long as I want with moderate pain in my   neck.  3: I cannot drive my car as long as I want because of moderate pain   in my  neck.  4: I can hardly drive at all because of severe pain in my neck.  5: I cannot drive my car at all.  1           Sleeping  0: I have no trouble sleeping.  1: My sleep is slightly disturbed (less than 1 hour sleepless).  2: My sleep is mildly disturbed (1-2 hours sleepless).  3: My sleep is moderately disturbed (2-3 hours sleepless).  4: My sleep is greatly disturbed (3-5 hours sleepless).  5: My sleep is completely disturbed (5-7 hours)  3           Recreation  0: I am able to engage in all of my recreational activities with no neck pain at all.  1: I am able to engage in all of my recreational activities with some pain in my neck.  2: I am able to engage in most, but not all of my recreational activities because of pain in my neck.  3: I am able to engage in a few of my recreational activities because of pain in my neck.  4: I can hardly do any recreational activities because of pain in my neck.  5: I cannot do any recreational activities at all.  4           TOTAL SCORE 22/50               Shoulder Pain and Disability Index (SPADI)   PAIN SCALE:   How severe is your pain?   Pain scale 0/10 to 10/10 11-07 2023      What number describes your pain at its worst?  10      When lying on the involved side? 10      Reaching for something on a high shelf?  6      Touching the back of your neck? 6      Pushing with the involved arm? 8      Total Pain Score (MAX 50) 40             DISABILITY SCALE:   How much difficulty do you have?  Difficulty scale 0/10 to 10/10        Washing your hair? 7      Washing your back? 7      Putting on an undershirt or jumper?  5      Putting on a shirt that buttons down the front?  5      Putting on your pants?  5      Placing an object on a high shelf?  6      Carrying a heavy object of 10 pounds (4.5 KG) 2      Removing something from your back pocket?  0      Total Disability Score (MAX 80) 37             TOTAL SPADI SCORE () 77        Review of Diagnostic Studies:  I have  independently reviewed and interpreted the images with the patient and used the images and a tridimensional spine model to explain findings. I have also reviewed the reports.  MRI of the cervical spine on 8/25/2067 fusion.  Vertebral body heights are preserved.  Alignment is preserved.  Multilevel facet arthropathy. The spinal cord shows normal signal and configuration.  Axial imaging:  C2-C3: Facet arthropathy. facet arthropathy. No significant canal or foraminal stenosis.  C3-C4: Diffuse annular bulge. Facet arthropathy. Mild canal stenosis and mild bilateral foraminal stenosis  C4-C5: Facet arthropathy. facet arthropathy. No significant canal or foraminal stenosis  C5-C6: Endplate hypertrophy eccentric to the right. Facet arthropathy. Mild to moderate right foraminal stenosis  C6-C7: Evidence of prior discectomy and fusion  C7-T1: Disc bulge. Facet arthropathy. Mild bilateral foraminal stenosis.    Review of Systems   Constitutional:  Positive for fatigue.   HENT:  Positive for tinnitus.    Cardiovascular:  Positive for leg swelling.   Musculoskeletal:  Positive for myalgias, neck pain and neck stiffness.   All other systems reviewed and are negative.        Patient Active Problem List   Diagnosis    Non-STEMI (non-ST elevated myocardial infarction)    Hypertension    Hyperlipidemia    Diabetes mellitus    Coronary artery disease involving native coronary artery of native heart without angina pectoris    Class 2 obesity in adult    Cervical spondylosis without myelopathy    History of fusion of cervical spine    DDD (degenerative disc disease), cervical    Cervical strain    Conn tiss and disc stenosis of intvrt foramin of cerv region    Shoulder impingement    Tendinopathy of rotator cuff       Past Medical History:   Diagnosis Date    Arthritis     Cervical disc disorder     Coronary artery disease involving native coronary artery of native heart without angina pectoris     CTS (carpal tunnel syndrome)      Diabetes mellitus     Extremity pain     Hyperlipidemia     Hypertension     Neck pain 2023    Non-STEMI (non-ST elevated myocardial infarction)     Peripheral neuropathy          Past Surgical History:   Procedure Laterality Date    CARDIAC CATHETERIZATION N/A 2018    Procedure: Left Heart Cath;  Surgeon: Andrea Enriquez MD;  Location: [facility] CATH INVASIVE LOCATION;  Service: Cardiovascular    CAROTID STENT      CARPAL TUNNEL RELEASE      CERVICAL DISCECTOMY LAMINECTOMY DECOMPRESSION POSTERIOR FUSION WITH INSTRUMENTATION      ACDF C6-7  11    CORONARY STENT PLACEMENT  2018    EPIDURAL BLOCK      HEMORRHOIDECTOMY      NECK SURGERY      ORTHOPEDIC SURGERY  2011    SPINAL FUSION      TONSILLECTOMY           Family History   Problem Relation Age of Onset    Heart disease Mother     Hyperlipidemia Mother     Heart attack Maternal Grandfather     Alcohol abuse Father          Social History     Socioeconomic History    Marital status:    Tobacco Use    Smoking status: Former     Packs/day: 1.00     Years: 10.00     Additional pack years: 0.00     Total pack years: 10.00     Types: Cigarettes     Quit date: 1978     Years since quittin.8    Smokeless tobacco: Never   Substance and Sexual Activity    Alcohol use: Not Currently     Comment: occasional    Drug use: No    Sexual activity: Yes     Partners: Female     Birth control/protection: None           Current Outpatient Medications:     aliskiren (TEKTURNA) 300 MG tablet, , Disp: , Rfl:     aspirin 81 MG EC tablet, Take 1 tablet by mouth Daily., Disp: 30 tablet, Rfl: 1    celecoxib (CeleBREX) 200 MG capsule, Take 1 capsule by mouth Daily., Disp: , Rfl:     cyclobenzaprine (FLEXERIL) 5 MG tablet, TAKE 1 TABLET ORAL ROUTE 3 TIMES PER DAY AS NEEDED, Disp: , Rfl:     gabapentin (NEURONTIN) 300 MG capsule, One tab PO qhs, then one tab BID, then one tab TID, Disp: 90 capsule, Rfl: 1    hydroCHLOROthiazide (HYDRODIURIL) 12.5 MG  "tablet, , Disp: , Rfl:     pioglitazone (ACTOS) 45 MG tablet, Take 1 tablet by mouth Daily., Disp: , Rfl:     rosuvastatin (CRESTOR) 20 MG tablet, Take 1 tablet by mouth Every Night. (Patient taking differently: Take 0.5 tablets by mouth Every Night.), Disp: 30 tablet, Rfl: 11    SITagliptin (JANUVIA) 100 MG tablet, Take 1 tablet by mouth Daily., Disp: , Rfl:     traMADol (ULTRAM) 50 MG tablet, TAKE 1 TABLET TWICE DAILY AS NEEDED FOR PAIN WITH FOOD, Disp: , Rfl:       Allergies   Allergen Reactions    Oxycodone-Acetaminophen GI Intolerance    Other Diarrhea, Nausea Only and Nausea And Vomiting    Atorvastatin Myalgia    Augmentin [Amoxicillin-Pot Clavulanate] Rash    Cephalexin Rash    Pedi-Pre Tape Spray [Wound Dressing Adhesive] Rash    Penicillins Rash         Ht 182.9 cm (72\")   Wt 132 kg (290 lb)   BMI 39.33 kg/m²       Physical Exam:  Constitutional: Patient appears well-developed, well-nourished, well-hydrated, appears younger than stated age  HEENT: Head: Normocephalic and atraumatic  Eyes: Conjunctivae and lids are normal  Pupils: Equal, round, reactive to light  Neck: Trachea normal. Neck supple. No JVD present.   Lymphatic: No cervical adenopathy  Musculoskeletal   Gait and station: Gait evaluation demonstrated a normal gait. Able to walk on heels, toes, tandem walking   Cervical Spine: Passive and active range of motion are almost full and without pain. Extension, flexion, lateral flexion, rotation of the cervical spine did not increase or reproduce pain. Cervical facet joint loading maneuvers are negative.   Muscles: Presence of active trigger points: None  Right Shoulder: The range of motion of the right glenohumeral joint is limited secondary to pain with abduction at 70 degrees and FF at 90 degrees. Rotator cuff strength is overall 4/5, worse at the supraspinatus. Subacromial Impingement +  Left Shoulder: The range of motion of the left glenohumeral joint is limited secondary to pain with abduction " at 70 degrees and FF at 90 degrees. Rotator cuff strength is overall 4/5, worse at the supraspinatus. Subacromial Impingement +  Neurological:   Patient is alert and oriented to person, place, and time.   Speech: Normal.   Cortical function: Normal mental status.   Cranial nerves 2-12: intact.   Reflex Scores:  Right brachioradialis: 1+  Left brachioradialis: 1+  Right biceps: 1+  Left biceps: 1+  Right triceps: 1+  Left triceps: 1+  Right patellar: 0+  Left patellar: 0+  Right Achilles: 0+  Left Achilles: 0+  Motor strength: 5/5  Motor Tone: Normal  Involuntary movements: None.   Superficial/Primitive Reflexes: Primitive reflexes were absent.   Right Blevins: Absent  Left Blevins: Absent  Right ankle clonus: Absent  Left ankle clonus: Absent   Babinsky: Absent  Spurling sign: Negative. Neck tornado test: Negative. Lhermitte sign: Negative. Long tract signs: Negative.   Sensory exam: Intact to light touch, intact pain and temperature sensation, intact vibration sensation and normal proprioception  Coordination: Finger to nose: Normal. Heel to shin: Normal. Balance: Normal Romberg's sign: Negative   Skin and subcutaneous tissue: Skin is warm and intact. No rash noted. No cyanosis.   Psychiatric: Judgment and insight: Normal. Recent and remote memory: Intact. Mood and affect: Normal.       ASSESSMENT:   1. Conn tiss and disc stenosis of intvrt foramin of cerv region    2. DDD (degenerative disc disease), cervical    3. History of fusion of cervical spine    4. Strain of neck muscle, sequela    5. Shoulder impingement    6. Tendinopathy of rotator cuff, unspecified laterality    7. Class 2 obesity with body mass index (BMI) of 37.0 to 37.9 in adult, unspecified obesity type, unspecified whether serious comorbidity present    8. Type 2 diabetes mellitus with hyperglycemia, without long-term current use of insulin      PLAN/MEDICAL DECISION MAKING:  Mr. Maxime Rodriguez, 73 y.o. male presents with a longstanding history of  chronic intractable cervical spine pain. Maxime Rodriguez underwent ACDF at C6-C7 in 2011 by Dr. Tee. He has done well after surgery.  In August of 2023, he was pulling on a garden hose and fell backwards and flipped over. He developed stiffness in his neck and pain in his neck and shoulders. He also had soreness in his forearms. MRI of the cervical spine on 08/25/2067 fusion.  Evidence of prior discectomy and fusion at C6-C7. Multilevel facet arthropathy. C3-C4: Diffuse annular bulge. Facet arthropathy. Mild canal stenosis and mild bilateral foraminal stenosis. At C5-C6: Endplate hypertrophy eccentric to the right. Facet arthropathy. Lateral recess stenosis. Mild to moderate right foraminal stenosis.  Maxime Rodriguez underwent neurosurgical consultation with Dr Tee and was found not to be a surgical candidate. Maxime Rodriguez underwent follow-up neurosurgical consultation with Annita Patino PA-C on 10/25/2023, and was found not to be a surgical candidate. Pain has progressed in intensity over the past months. Patient did not undergo X-rays of his shoulders or electrodiagnostic studies of his upper extremities. Maxime Rodriguez has failed to obtain pain relief with conservative measures for more than 3 months including oral analgesics, opioids, topical analgesics, ice, heat, physical therapy (ongoing, last visit on 11/2), physical therapist directed home exercise program HEP (ongoing), therapeutic massage, to name a few. A comprehensive evaluation including history and physical exam along with pertinent physiologic and functional assessment was performed. Patient presents with intractable pain due to the diagnoses listed above. Patient has failed to respond to conservative modalities, as referenced under HPI. I have documented the impact of patient's moderate-to-severe pain contributing to significant impairment in daily activities, ADLs, and a negative impact on the patient's quality of life, as reflected on  Global Pain Scale 54/100; Neck pain disability index questionnaire 22/50; Shoulder Pain and Disability Index (SPADI) 77/130; Tinetti Gait & Balance Assessment Tool  (low risk for falls). I have reviewed pertinent supporting diagnostic studies of patient's chronic pain condition as well as all available pertinent medical records to patient's chronic pain condition including previous therapies, as referenced above. I believe that Jerry is dealing with 2 different sources of pain: Shoulder impingement Vs cervical radiculitis. PHQ-2 Depression Screening 0; Pain Self-Efficacy Questionnaire (PSEQ) 10/60. I had a lengthy conversation with . Maxime Rodriguez regarding his chronic pain condition and potential therapeutic options including risks, benefits, alternative therapies, to name a few. We have discussed using a stepwise approach starting with the least intense level of care as determined by the extent required to diagnose and or treat a patient's condition. The proposed treatments are consistent with the patient's medical condition and known to be safe and effective by current guidelines and the standard of care. The duration and frequency proposed are considered appropriate for the service in accordance with accepted standards of medical practice for the diagnosis and treatment of the patient's condition and intended to improve the patient's level of function. These services will be furnished in a setting appropriate to the patient's medical needs and condition. Therefore, I have proposed the following plan:    1. Interventional pain management measures: Patient does not take blood thinners.   A. Treatment of Cervical Stenosis/Recent Exacerbation from Cervical Strain: Patient will be scheduled for cervical epidural steroid injection by interlaminar approach using the lowest effective dose of steroids, under C-arm fluoroscopic guidance, with the use of contrast dye (unless contraindicated) to confirm appropriate  needle placement and spread of contrast dye. We may repeat depending on patient's outcome and following current guidelines: Epidurals will be limited to a maximum of 4 sessions per spinal region in a rolling twelve (12) month period. Continuation of epidural steroid injections over 12 months would only be considered under the following provisions;  Patient is a high-risk surgical candidate, or the patient does not desire surgery, or recurrence of pain in the same location relieved with ESIs for at least three months and epidural provides at least 50% sustained improvement of pain and/or 50% objective improvement in function (using same scale as baseline)  Pain is severe enough to cause a significant degree of functional disability or vocational disability  The primary care provider will be notified regarding continuation of procedures and repeat steroid use   Patient will follow-up with Dr. Tee thereafter.   B. Treatment of Bilateral Shoulder Pain/Impingement/Rotator Cuff Tendinopathy: We have discussed prospects of diagnostic and therapeutic bilateral subacromial bursa injections combined with tendon sheath injections of the bilateral supraspinatus Vs referral to orthopedics    2. Diagnostic studies:   A. Bilateral shoulder X-rays, full views  B. If he continues to struggle with pain, we may obtain EMG/NCV of the bilateral upper extremities   C. If he continues to struggle with pain, we may obtain MRI of the bilateral shoulders without contrast   3. Pharmacological measures: Reviewed and discussed;   A. Patient takes  celecoxib, cyclobenzaprine, gabapentin  B. Trial with prilocaine 2%, lidocaine 10%, imipramine 3%, capsaicin 0.001%, and mannitol 20%  cream, apply 1 to 2 grams of cream to the shoulder areas every 4 to 6 hours as needed    4. Long-term rehabilitation efforts:  A. The patient does not have a history of falls. Also, I performed a risk assessment for falls using the Tinetti gait & balance assessment  tool (scored low risk for falls).   B. Patient will start a comprehensive physical therapy program at [facility] for neurodynamics, upper body strengthening, posture correction, ultrasound, ASTYM, E-STIM, myofascial release, cupping, dry needling, home exercise program, 2-3 x per week for 8 weeks   C. Contrast therapy to both shoulders: Apply ice-packs for 15-20 minutes, followed by heating pads for 15-20 minutes to affected area   D. Start a low impact exercise program such as water therapy, swimming, yoga, daily walks for fitness  E. I have prescribed a home exercise program with instructions. I have spent a significant amount of time talking with the patient and providing specific recommendations regarding lifestyle modification, preventive measures, and self-care management of chronic pain.  In addition, I have provided a copy of the booklet Care of the neck by Selwyn Vargas MD and Shaina Lopez MD to be used as a physician supervised home exercise program  F. Referral to [facility]. Patient's Body mass index is 39.33 kg/m². Patient counseled on the importance of weight loss to help with overall health and pain control.   G. Maxime Rodriguez  reports that he quit smoking about 45 years ago. His smoking use included cigarettes. He has a 10.00 pack-year smoking history. He has never used smokeless tobacco.    5. The patient has been instructed to contact my office with any questions or difficulties. The patient understands the plan and agrees to proceed accordingly.    The patient has a documented plan of care to address chronic pain. Maxime Rodriguez reports a pain score of 8/10.  Given his pain assessment as noted, treatment options were discussed and the following options were decided upon as a follow-up plan to address the patient's pain: continuation of current treatment plan for pain, educational materials on pain management, home exercises and therapy,  prescription for non-opiod analgesics, referral to Physical Therapy, referral to specialist for assistance in pain treatment guidance, steroid injections, use of non-medical modalities (ice, heat, stretching and/or behavior modifications), and interventional pain management measures .               Pain Management Panel           No data to display                 KAYLA query complete. KAYLA reviewed by Ravinder Jama MD.     Pain Medications               aspirin 81 MG EC tablet Take 1 tablet by mouth Daily.    celecoxib (CeleBREX) 200 MG capsule Take 1 capsule by mouth Daily.    cyclobenzaprine (FLEXERIL) 5 MG tablet TAKE 1 TABLET ORAL ROUTE 3 TIMES PER DAY AS NEEDED    gabapentin (NEURONTIN) 300 MG capsule One tab PO qhs, then one tab BID, then one tab TID    traMADol (ULTRAM) 50 MG tablet TAKE 1 TABLET TWICE DAILY AS NEEDED FOR PAIN WITH FOOD             No orders of the defined types were placed in this encounter.       Please note that portions of this note were completed with a voice recognition program.   Any copied data in any portion of my note has been reviewed by myself and accurate.     The 21st Century Cures Act makes medical notes like this available to patients in the interest of transparency. This is a medical document intended as peer to peer communication. It is written in medical language and may contain abbreviations or verbiage that are unfamiliar. It may appear blunt or direct. Medical documents are intended to carry relevant information, facts as evident, and the clinical opinion of the practitioner.     Ravinder Jama MD    Patient Care Team:  Brad Huang MD as PCP - General (Internal Medicine)  Georgi Lanza MD as Referring Physician (Internal Medicine)  Ravinder Jama MD as Consulting Physician (Pain Medicine)     No orders of the defined types were placed in this encounter.        Future Appointments   Date Time Provider Department Center   2/28/2024 10:15 AM  Andrea Enriquez MD [facility]

## 2023-11-07 ENCOUNTER — HOSPITAL ENCOUNTER (OUTPATIENT)
Dept: GENERAL RADIOLOGY | Facility: HOSPITAL | Age: 73
Discharge: HOME | End: 2023-11-07
Admitting: ANESTHESIOLOGY
Payer: MEDICARE

## 2023-11-07 ENCOUNTER — OFFICE VISIT (OUTPATIENT)
Dept: PAIN MEDICINE | Facility: CLINIC | Age: 73
End: 2023-11-07
Payer: MEDICARE

## 2023-11-07 ENCOUNTER — TELEPHONE (OUTPATIENT)
Dept: PAIN MEDICINE | Facility: CLINIC | Age: 73
End: 2023-11-07

## 2023-11-07 VITALS — WEIGHT: 290 LBS | HEIGHT: 72 IN | BODY MASS INDEX: 39.28 KG/M2

## 2023-11-07 DIAGNOSIS — M99.71 CONN TISS AND DISC STENOSIS OF INTVRT FORAMIN OF CERV REGION: ICD-10-CM

## 2023-11-07 DIAGNOSIS — E66.9 CLASS 2 OBESITY WITH BODY MASS INDEX (BMI) OF 37.0 TO 37.9 IN ADULT, UNSPECIFIED OBESITY TYPE, UNSPECIFIED WHETHER SERIOUS COMORBIDITY PRESENT: ICD-10-CM

## 2023-11-07 DIAGNOSIS — S16.1XXS STRAIN OF NECK MUSCLE, SEQUELA: ICD-10-CM

## 2023-11-07 DIAGNOSIS — M25.819 SHOULDER IMPINGEMENT: ICD-10-CM

## 2023-11-07 DIAGNOSIS — E11.65 TYPE 2 DIABETES MELLITUS WITH HYPERGLYCEMIA, WITHOUT LONG-TERM CURRENT USE OF INSULIN: ICD-10-CM

## 2023-11-07 DIAGNOSIS — M99.71 CONN TISS AND DISC STENOSIS OF INTVRT FORAMIN OF CERV REGION: Primary | ICD-10-CM

## 2023-11-07 DIAGNOSIS — Z98.1 HISTORY OF FUSION OF CERVICAL SPINE: ICD-10-CM

## 2023-11-07 DIAGNOSIS — M67.919 TENDINOPATHY OF ROTATOR CUFF, UNSPECIFIED LATERALITY: ICD-10-CM

## 2023-11-07 DIAGNOSIS — M50.30 DDD (DEGENERATIVE DISC DISEASE), CERVICAL: ICD-10-CM

## 2023-11-07 PROCEDURE — 73030 X-RAY EXAM OF SHOULDER: CPT

## 2023-11-07 NOTE — TELEPHONE ENCOUNTER
Spoke with the patient and explained that Dr. Jama may not sign the order for his compounding cream until the end of the day, and that the pharmacy may need a day or so to get his order ready. Patient voiced understanding. Gave patient the names of the physical therapy clinics most frequently recommended by Dr. Jama.

## 2023-11-07 NOTE — TELEPHONE ENCOUNTER
Provider: ADALBERTO     Caller: JEAN    Relationship to Patient: SELF    Phone Number: [phone] (home)     Reason for Call: PATIENT SEEN. DR GLOVER. HE GOT INSTRUCTIONS FROM  TO GET SOMETHING (ITS A CREAM) FROM THE [facility]OUND [facility]ARM[facility]. IT HASNT BEEN SENT THERE YET. PATIENT WANTS TO KNOW HOW LONG DOES IT TAKE.    PHYSICAL THERAPY WAS ALSO DISCUSSED. PATIENT IS UNCLEAR WHICH ONE TO GO TO BECAUSE THEY GAVE HIM TWO CHOICES.  HE SAID THERE IS SOMEONE DEFINITE THE DR WANTED HIM TO SEE. THAT IS WHO HE WANTS TO SEE.      When was the patient last seen: 11/07/2023

## 2023-11-15 ENCOUNTER — OUTSIDE FACILITY SERVICE (OUTPATIENT)
Dept: PAIN MEDICINE | Facility: CLINIC | Age: 73
End: 2023-11-15
Payer: MEDICARE

## 2023-11-27 ENCOUNTER — TELEPHONE (OUTPATIENT)
Dept: PAIN MEDICINE | Facility: CLINIC | Age: 73
End: 2023-11-27
Payer: MEDICARE

## 2023-11-27 NOTE — TELEPHONE ENCOUNTER
Caller: Maxime Rodriguez    Relationship: Self    Best call back number: [phone]    What is the best time to reach you: ANYTIME     Who are you requesting to speak with (clinical staff, provider,  specific staff member): CLINICAL STAFF     Do you know the name of the person who called: REQUESTING CALL BACK     What was the call regarding: PATIENT HAD A CERVICAL EPIDURAL ON 11-15-23 AND IS NOT SURE IF HE WAS SUPPOSED TO FOLLOW UP WITH DR LENNON OR DR GLOVER. PLEASE CALL TO DISCUSS. OKAY TO LEAVE A VOICEMAIL.     Is it okay if the provider responds through MyChart: NO

## 2023-11-27 NOTE — TELEPHONE ENCOUNTER
Spoke with patient, he said his injection only gave him a few weeks of relief and his pain returned on Wednesday. He said he would start PT this week. Do you want to see him in office sooner than 12 weeks?

## 2023-11-29 ENCOUNTER — HOSPITAL ENCOUNTER (OUTPATIENT)
Dept: NUTRITION | Facility: HOSPITAL | Age: 73
Setting detail: RECURRING SERIES
Discharge: HOME | End: 2023-11-29

## 2023-11-29 NOTE — CONSULTS
[facility]          Initial 60 Minute Nutrition Visit    Date: 2023   Patient Name: Maxime Rodriguez  : 1950   MRN: [id number]   Referring Provider: Ravinder Jama MD    Reason for Visit: weight loss  Visit Format: IP    Nutrition Assessment       Social History:   Social History     Socioeconomic History    Marital status:    Tobacco Use    Smoking status: Former     Packs/day: 1.00     Years: 10.00     Additional pack years: 0.00     Total pack years: 10.00     Types: Cigarettes     Quit date: 1978     Years since quittin.9    Smokeless tobacco: Never   Substance and Sexual Activity    Alcohol use: Not Currently     Comment: occasional    Drug use: No    Sexual activity: Yes     Partners: Female     Birth control/protection: None     Active Problem List:   Patient Active Problem List    Diagnosis     Conn tiss and disc stenosis of intvrt foramin of cerv region [M99.71]     Shoulder impingement [M25.819]     Tendinopathy of rotator cuff [M67.919]     Cervical spondylosis without myelopathy [M47.812]     History of fusion of cervical spine [Z98.1]     DDD (degenerative disc disease), cervical [M50.30]     Cervical strain [S16.1XXA]     Class 2 obesity in adult [E66.9]     Coronary artery disease involving native coronary artery of native heart without angina pectoris [I25.10]     Non-STEMI (non-ST elevated myocardial infarction) [I21.4]     Hypertension [I10]     Hyperlipidemia [E78.5]     Diabetes mellitus [E11.9]       Current Medications:   Current Outpatient Medications:     aliskiren (TEKTURNA) 300 MG tablet, , Disp: , Rfl:     aspirin 81 MG EC tablet, Take 1 tablet by mouth Daily., Disp: 30 tablet, Rfl: 1    celecoxib (CeleBREX) 200 MG capsule, Take 1 capsule by mouth Daily., Disp: , Rfl:     cyclobenzaprine (FLEXERIL) 5 MG tablet, TAKE 1 TABLET ORAL ROUTE 3 TIMES PER DAY AS NEEDED, Disp: , Rfl:     gabapentin (NEURONTIN) 300 MG capsule, One tab PO  "qhs, then one tab BID, then one tab TID, Disp: 90 capsule, Rfl: 1    Gel Base gel, prilocaine 2% lidocaine 10% imipramine 3% capsaicin 0.001% mannitol 20%, 1 to 2 grams of cream to the affected areas Q4-6PRN, Disp: 240 g, Rfl: 5    hydroCHLOROthiazide (HYDRODIURIL) 12.5 MG tablet, , Disp: , Rfl:     pioglitazone (ACTOS) 45 MG tablet, Take 1 tablet by mouth Daily., Disp: , Rfl:     rosuvastatin (CRESTOR) 20 MG tablet, Take 1 tablet by mouth Every Night. (Patient taking differently: Take 0.5 tablets by mouth Every Night.), Disp: 30 tablet, Rfl: 11    SITagliptin (JANUVIA) 100 MG tablet, Take 1 tablet by mouth Daily., Disp: , Rfl:     traMADol (ULTRAM) 50 MG tablet, TAKE 1 TABLET TWICE DAILY AS NEEDED FOR PAIN WITH FOOD, Disp: , Rfl:     Labs: n/a    Hunger Vital Sign Food Insecurity Assessment:  Within the past 12 months I/we worried whether our food would run out before I/we got money to buy more: Not discussed   Within the past 12 months the food I/we bought just didn't last and I/we didn't have money to get more: Not discussed   Use of food assistance programs (WIC, food stamps, food anaya) Not discussed       Food & Nutrition Related History       Food Allergies: none  Food Intolerances: none  Food Behavior: n/a  Nutrition Impact Symptoms:  n/a  Gastrointestinal conditions that impact intake or food choices: n/a  Details at home: n/a  Who prepares most meals: patient   Who does grocery shopping: patient   How many meals are purchased from fast food/sit down restaurants per week: 4  Difficulty chewin - Normal  Difficulty swallowin - Normal  Diet requirement related to personal preference or cultural belief: in general, a \"healthy\" diet    History of eating disorder/disordered eating habits: None  Language/communication details: English  Barriers to learning: No barriers identified at this time    24 Hour Recall:   Time Food/beverages consumed   B Eggs, chamberlain, hash browns   S pretzels   L Chamberlain cheeseburger " "  D Fish sandwich, salad                     Additional comments: n/a    Anthropometrics      Height:   Ht Readings from Last 1 Encounters:   11/07/23 182.9 cm (72\")     Weight:   Wt Readings from Last 3 Encounters:   11/07/23 132 kg (290 lb)   10/25/23 129 kg (285 lb)   10/04/23 128 kg (283 lb)     BMI: There is no height or weight on file to calculate BMI.   Weight Change: n/a     Physical Activity       Barriers to physical activity: n/a     Physical activity comments: n/a     Estimated Needs     Estimated Energy Needs: not discussed    Estimated Protein Needs: not discussed     Estimated Fluid Needs: not discussed     Discussion / Education      Pt is a 73-year-old male referred for weight loss. Patient has T2DM. He stated that his biggest challenge with his diet is portion sizes. He states that he eats out 4-5 times per week. He states that he typically eats 3 meals a day and not many snacks throughout. RD educated patient on the three macronutrients and what each do for our body. RD discussed how to build a healthy plate by using the plate method. RD explained the importance of portion control and balanced meals. RD discussed serving sizes for carbohydrate servings. RD discussed the importance of fiber for GI health and satiety. RD discussed healthy snack options and quick meal ideas. RD encouraged adding physical activity to daily routine and gave examples of how to incorporate that into routine. Patient stated that he wants to incorporate swimming in weekly routine. He stated he was going to reach out to [employer] about getting a membership to the local [facility]. RD encouraged patient to reach out with any additional questions or concerns.     Assessment of patient engagement: Engaged    Measurement of understanding: Patient verbalized understanding    Resources Provided: 3 Macronutrients, Plate method handout, healthy snack ideas handouts, weight management packet     Goal (s)      Goal 1: Exercise 2 " times per week      Goal 2: Drink water instead of soda with dinner meal     Goal 3: Read food labels while grocery shopping     Plan of Care     PES Statement:   Overweight / Obesity related to diet and lifestye as evidenced by BMI.     Follow Up Visit      Follow Up:   December 27th @ 1:00pm    Total of 60 minutes spent with patient on nutrition counseling. Education based on Academy of Nutrition and Dietetics guidelines. Patient was provided with RD's contact information. Thank you for this referral.

## 2023-12-04 ENCOUNTER — TELEPHONE (OUTPATIENT)
Dept: NEUROSURGERY | Facility: CLINIC | Age: 73
End: 2023-12-04
Payer: MEDICARE

## 2023-12-04 DIAGNOSIS — S16.1XXA CERVICAL STRAIN, INITIAL ENCOUNTER: Primary | ICD-10-CM

## 2023-12-04 DIAGNOSIS — Z98.1 HISTORY OF FUSION OF CERVICAL SPINE: ICD-10-CM

## 2023-12-04 DIAGNOSIS — M54.12 CERVICAL RADICULOPATHY: ICD-10-CM

## 2023-12-04 NOTE — TELEPHONE ENCOUNTER
I spoke with the pt and advised him that there wasn't much more that we could do for him, that he needs to follow up with [facility], and that they recommend a EMG. There isn't an order for an EMG/NCV. Pt advised that he would love to do an EMG to get some relief, and to understand why he doesn't have relief.

## 2023-12-04 NOTE — TELEPHONE ENCOUNTER
Caller: Maxime Rodriguez    Relationship to patient: Self    Best call back number: [phone]    Chief complaint: CONTINUED SYMPTOMS    Type of visit: FOLLOW UP    Requested date: NEXT AVAILABLE     If rescheduling, when is the original appointment: N/A     Additional notes: PATIENT STATES EPIDURAL DID NOT LAST AND PT IS NOT HELPING, SO HE WOULD LIKE TO SEE WHAT THE NEXT STEPS ARE (I.E. NERVE CONDUCTION STUDY).    HE IS WILLING TO DO 2 MORE WEEKS OF PT, BUT DOES NOT FEEL IT IS HELPING. MUSCLES ARE STILL ACHING IN ARMS, SHOULDER, WRIST. STILL TAKING GABAPENTIN AND CELEBREX. PLEASE CALL PATIENT TO ADVISE.

## 2023-12-06 NOTE — TELEPHONE ENCOUNTER
Spoke w/ Yehuda and his appt has been sched for 12/12/23 @12:30 for EMG and f/u w/ Nicole Patino PA-C @2:30 same day. Pt is very thankful for it.

## 2023-12-12 ENCOUNTER — HOSPITAL ENCOUNTER (OUTPATIENT)
Dept: NEUROLOGY | Facility: HOSPITAL | Age: 73
Discharge: HOME | End: 2023-12-12
Admitting: PHYSICIAN ASSISTANT
Payer: MEDICARE

## 2023-12-12 ENCOUNTER — OFFICE VISIT (OUTPATIENT)
Dept: NEUROSURGERY | Facility: CLINIC | Age: 73
End: 2023-12-12
Payer: MEDICARE

## 2023-12-12 VITALS
HEIGHT: 72 IN | WEIGHT: 285 LBS | TEMPERATURE: 97.8 F | DIASTOLIC BLOOD PRESSURE: 66 MMHG | SYSTOLIC BLOOD PRESSURE: 126 MMHG | BODY MASS INDEX: 38.6 KG/M2

## 2023-12-12 DIAGNOSIS — Z98.1 HISTORY OF FUSION OF CERVICAL SPINE: ICD-10-CM

## 2023-12-12 DIAGNOSIS — M25.512 CHRONIC PAIN OF BOTH SHOULDERS: ICD-10-CM

## 2023-12-12 DIAGNOSIS — S16.1XXA CERVICAL STRAIN, INITIAL ENCOUNTER: ICD-10-CM

## 2023-12-12 DIAGNOSIS — M25.511 CHRONIC PAIN OF BOTH SHOULDERS: ICD-10-CM

## 2023-12-12 DIAGNOSIS — M54.12 CERVICAL RADICULOPATHY: Primary | ICD-10-CM

## 2023-12-12 DIAGNOSIS — M54.12 CERVICAL RADICULOPATHY: ICD-10-CM

## 2023-12-12 DIAGNOSIS — G89.29 CHRONIC PAIN OF BOTH SHOULDERS: ICD-10-CM

## 2023-12-12 DIAGNOSIS — S16.1XXA STRAIN OF NECK MUSCLE, INITIAL ENCOUNTER: ICD-10-CM

## 2023-12-12 PROCEDURE — 95910 NRV CNDJ TEST 7-8 STUDIES: CPT | Performed by: PSYCHIATRY & NEUROLOGY

## 2023-12-12 PROCEDURE — 95886 MUSC TEST DONE W/N TEST COMP: CPT | Performed by: PSYCHIATRY & NEUROLOGY

## 2023-12-12 PROCEDURE — 95910 NRV CNDJ TEST 7-8 STUDIES: CPT

## 2023-12-12 PROCEDURE — 95886 MUSC TEST DONE W/N TEST COMP: CPT

## 2023-12-12 NOTE — PROGRESS NOTES
Patient: Maxime Rodriguez  : 1950  Chart #: [id number]    Date of Service: 2023    CHIEF COMPLAINT: Neck and bilateral shoulder pain    History of Present Illness Mr. Rodriguez is seen in follow-up.  He is a 73-year-old former  at [employer] who is well-known to Dr. Tee's service.  He underwent ACDF at C6-7 in .  In general he has done well.  More recently in August of this year he was pulling on a garden hose when he fell backwards and flipped over.  He developed increased neck stiffness and pain that has involved his shoulders.  He has had soreness in his forearms.  He complains of a little tingling but no actual numbness.  He was treated with a steroid pack and felt much better but his symptoms recurred thereafter.  He was also treated with diclofenac, Flexeril, and tramadol.  At one point he was seen in the emergency room.  He is now on Celebrex.  His symptoms seem to be worse at night.  MRI demonstrates diffuse spondylosis with moderate canal narrowing at multiple levels. Dr Tee saw him and did not feel like he was a surgical candidate. He referred him for more therapy. Overall, patient feels worse. His arms ache constantly. He is increasingly frustrated. Some days the pain is in the left arm, other days its more in the left. Generally, the pain is in the proximal arm but sometimes he will feel it into the forearms. He has occasional numbness in the ulnar fingers as well. Today he has pain in the right wrist and thumb.  A recent epidural injection provided no relief- he almost feels worse after the injection       Past Medical History:   Diagnosis Date    Arthritis     Cervical disc disorder     Coronary artery disease involving native coronary artery of native heart without angina pectoris     CTS (carpal tunnel syndrome)     Diabetes mellitus     Extremity pain     Hyperlipidemia     Hypertension     Neck pain 2023    Non-STEMI (non-ST elevated myocardial infarction)     Peripheral  neuropathy          Current Outpatient Medications:     aliskiren (TEKTURNA) 300 MG tablet, , Disp: , Rfl:     aspirin 81 MG EC tablet, Take 1 tablet by mouth Daily., Disp: 30 tablet, Rfl: 1    celecoxib (CeleBREX) 200 MG capsule, Take 1 capsule by mouth Daily., Disp: , Rfl:     gabapentin (NEURONTIN) 300 MG capsule, One tab PO qhs, then one tab BID, then one tab TID, Disp: 90 capsule, Rfl: 1    Gel Base gel, prilocaine 2% lidocaine 10% imipramine 3% capsaicin 0.001% mannitol 20%, 1 to 2 grams of cream to the affected areas Q4-6PRN, Disp: 240 g, Rfl: 5    hydroCHLOROthiazide (HYDRODIURIL) 12.5 MG tablet, , Disp: , Rfl:     pioglitazone (ACTOS) 45 MG tablet, Take 1 tablet by mouth Daily., Disp: , Rfl:     rosuvastatin (CRESTOR) 20 MG tablet, Take 1 tablet by mouth Every Night. (Patient taking differently: Take 0.5 tablets by mouth Every Night.), Disp: 30 tablet, Rfl: 11    SITagliptin (JANUVIA) 100 MG tablet, Take 1 tablet by mouth Daily., Disp: , Rfl:     Past Surgical History:   Procedure Laterality Date    CARDIAC CATHETERIZATION N/A 2018    Procedure: Left Heart Cath;  Surgeon: Andrea Enriquez MD;  Location: [facility] CATH INVASIVE LOCATION;  Service: Cardiovascular    CAROTID STENT      CARPAL TUNNEL RELEASE      CERVICAL DISCECTOMY LAMINECTOMY DECOMPRESSION POSTERIOR FUSION WITH INSTRUMENTATION      ACDF C6-7  11    CORONARY STENT PLACEMENT  2018    EPIDURAL BLOCK      HEMORRHOIDECTOMY      NECK SURGERY      ORTHOPEDIC SURGERY  2011    SPINAL FUSION      TONSILLECTOMY         Social History     Socioeconomic History    Marital status:    Tobacco Use    Smoking status: Former     Packs/day: 1.00     Years: 10.00     Additional pack years: 0.00     Total pack years: 10.00     Types: Cigarettes     Quit date: 1978     Years since quittin.9    Smokeless tobacco: Never   Substance and Sexual Activity    Alcohol use: Not Currently     Comment: occasional    Drug use: No     Sexual activity: Yes     Partners: Female     Birth control/protection: None         Review of Systems   Constitutional:  Negative for activity change, appetite change, chills, diaphoresis, fatigue, fever and unexpected weight change.   HENT:  Positive for postnasal drip and sore throat. Negative for congestion, dental problem, drooling, ear discharge, ear pain, facial swelling, hearing loss, mouth sores, nosebleeds, rhinorrhea, sinus pressure, sinus pain, sneezing, tinnitus, trouble swallowing and voice change.    Eyes:  Negative for photophobia, pain, discharge, redness, itching and visual disturbance.   Respiratory:  Positive for cough. Negative for apnea, choking, chest tightness, shortness of breath, wheezing and stridor.    Cardiovascular:  Negative for chest pain, palpitations and leg swelling.   Gastrointestinal:  Negative for abdominal distention, abdominal pain, anal bleeding, blood in stool, constipation, diarrhea, nausea, rectal pain and vomiting.   Endocrine: Negative for cold intolerance, heat intolerance, polydipsia, polyphagia and polyuria.   Genitourinary:  Negative for decreased urine volume, difficulty urinating, dysuria, enuresis, flank pain, frequency, genital sores, hematuria, penile discharge, penile pain, penile swelling, scrotal swelling, testicular pain and urgency.   Musculoskeletal:  Positive for myalgias and neck pain. Negative for arthralgias, back pain, gait problem, joint swelling and neck stiffness.   Skin:  Negative for color change, pallor, rash and wound.   Allergic/Immunologic: Negative for environmental allergies, food allergies and immunocompromised state.   Neurological:  Negative for dizziness, tremors, seizures, syncope, facial asymmetry, speech difficulty, weakness, light-headedness, numbness and headaches.   Hematological:  Negative for adenopathy. Does not bruise/bleed easily.   Psychiatric/Behavioral:  Negative for agitation, behavioral problems, confusion, decreased  "concentration, dysphoric mood, hallucinations, self-injury, sleep disturbance and suicidal ideas. The patient is not nervous/anxious and is not hyperactive.        Objective   Vital Signs: Blood pressure 126/66, temperature 97.8 °F (36.6 °C), temperature source Infrared, height 182.9 cm (72\"), weight 129 kg (285 lb).  Physical Exam  Vitals and nursing note reviewed.   Constitutional:       General: He is not in acute distress.     Appearance: He is well-developed.   HENT:      Head: Normocephalic and atraumatic.   Psychiatric:         Behavior: Behavior normal.         Thought Content: Thought content normal.     Musculoskeletal:     Strength is intact in upper and lower extremities to direct testing.     Station and gait are normal.  Neurologic:     Muscle tone is normal throughout.     Sensation is intact to light touch throughout.     Patient is oriented to person, place, and time.         Independent review of radiographic imaging: MRI of the cervical spine dated 8/25/2023 demonstrates postoperative changes at C6-7.  There is diffuse spondylosis with moderate narrowing at multiple levels.  Spondylosis is most pronounced at C3-4 where there is central and bilateral foraminal narrowing.  At C4-5 there is right greater than left recess narrowing.    Electrodiagnostic studies have demonstrated ulnar neuropathy at the elbow on the left, mild  Underlying sensorimotor neuropathy, likely axonal, mild  No electrophysiologic evidence for radiculopathy in either arm    Assessment & Plan   Diagnosis:   Cervical spondylosis with possible radiculopathy  Cervical strain  3.   Mild ulnar neuropathy    Medical Decision Making: I reviewed MRI and clinical findings with Dr. Tee.  Patient has stenosis at multiple levels that could be source for his symptoms.  His pain moves around and does not follow a particular radicular pattern.  He has inquired about seeing ortho for his shoulder. I'm less inclined to think that is the issue " given his pain goes to his hands at times but we can rule it out per his request.  Ultimately, Dr Tee has recommended a CT/myelogram for further evaluation.  He will follow-up with Dr. Tee and further recommendations will be made at that time.        Diagnoses and all orders for this visit:    1. Cervical strain, initial encounter (Primary)    2. Cervical radiculopathy  -     IR Myelogram Cervical Spine; Future  -     CT Cervical Spine With Intrathecal Contrast; Future    3. Strain of neck muscle, initial encounter    4. Chronic pain of both shoulders                                      Annita Patino PA-C  Patient Care Team:  Brad Huang MD as PCP - General (Internal Medicine)  Georgi Lanza MD as Referring Physician (Internal Medicine)  Ravinder Jama MD as Consulting Physician (Pain Medicine)

## 2023-12-27 ENCOUNTER — HOSPITAL ENCOUNTER (OUTPATIENT)
Dept: NUTRITION | Facility: HOSPITAL | Age: 73
Setting detail: RECURRING SERIES
Discharge: HOME | End: 2023-12-27

## 2023-12-27 NOTE — PROGRESS NOTES
Adult Outpatient Nutrition  Assessment/PES    Patient Name:  Maxime Rodriguez  YOB: 1950  MRN: [id number]    Assessment Date:  12/27/2023    Comments:  Patient was seen today for free follow up visit referred for weight loss. RD discussed outcomes of goals that were set at initial visit. Patients goal was to drink water instead of soda with dinner. Pt stated that he has been incorporating more water in routine and cutting out soda. The second goal was to read food labels while grocery shopping. He stated that he always looks at the food label when shopping. Choosing foods low in saturated fat and added sugar. RD answered all questions that patient had.He stated that he has been watching his portion sizes during meals and cutting back on snacks in the evening. Patient did not want any new information at this time. Patient did not want to schedule follow up at this time. RD encouraged patient to call to schedule follow up if needed.                                           Electronically signed by:  Stu Morales RD  12/27/23 13:38 EST

## 2024-01-02 DIAGNOSIS — I25.10 CORONARY ARTERY DISEASE INVOLVING NATIVE CORONARY ARTERY OF NATIVE HEART WITHOUT ANGINA PECTORIS: Primary | ICD-10-CM

## 2024-01-03 ENCOUNTER — TELEPHONE (OUTPATIENT)
Dept: NEUROSURGERY | Facility: CLINIC | Age: 74
End: 2024-01-03
Payer: MEDICARE

## 2024-01-03 NOTE — TELEPHONE ENCOUNTER
Provider:  Sukumar  Surgery/Procedure:  DENISSE  Surgery/Procedure Date:    Last visit:   12/12/23  Next visit: NA     Reason for call:  Spoke with Mr. Rodriguez. He reports that he had bilateral shoulder injections with ortho on 12/20/23, and has had a good amount of relief in his arm pain since then. He states on occasion that he has some pain in his wrists, and occasional pain in his neck with positioning. He is not experiencing any numbness or tingling, or weakness at the moment. He wanted to follow up to see if he should proceed with the cervical myelogram, or give it some time to see if the pain returns.    He also wanted to discuss his work status. He has been out of work since September, he is a . He would like to know if he should remain off of work until he follows up with our office, or if he is ok to return?

## 2024-01-03 NOTE — TELEPHONE ENCOUNTER
Caller: Maxime Rodriguez    Relationship to patient: Self    Best call back number: [phone]     Patient is needing:   PATIENT WOULD LIKE AN UPDATE ON MYELOGRAM SCHEDULING.    WOULD LIKE A CALL BACK FROM PA, ELLIE GRIMALDO ON 12/20, REC'D A SHOT IN SHOULDERS, BUT DOES NOT KNOW IF THAT'S TEMPORARY, SO HE IS UNSURE IF MYELOGRAM IS STILL NEEDED.    PLEASE CALL TO ADVISE

## 2024-01-09 NOTE — TELEPHONE ENCOUNTER
Does he have any 300 mg tablets he can take at night for a week?  He can do that for a week then discontinue

## 2024-01-17 ENCOUNTER — HOSPITAL ENCOUNTER (OUTPATIENT)
Dept: CARDIOLOGY | Facility: HOSPITAL | Age: 74
Discharge: HOME | End: 2024-01-17
Payer: MEDICARE

## 2024-01-17 VITALS
SYSTOLIC BLOOD PRESSURE: 144 MMHG | HEART RATE: 67 BPM | BODY MASS INDEX: 38.6 KG/M2 | HEIGHT: 72 IN | WEIGHT: 285 LBS | DIASTOLIC BLOOD PRESSURE: 80 MMHG

## 2024-01-17 DIAGNOSIS — I25.10 CORONARY ARTERY DISEASE INVOLVING NATIVE CORONARY ARTERY OF NATIVE HEART WITHOUT ANGINA PECTORIS: ICD-10-CM

## 2024-01-17 PROCEDURE — 93017 CV STRESS TEST TRACING ONLY: CPT

## 2024-01-17 PROCEDURE — A9500 TC99M SESTAMIBI: HCPCS | Performed by: INTERNAL MEDICINE

## 2024-01-17 PROCEDURE — 78452 HT MUSCLE IMAGE SPECT MULT: CPT

## 2024-01-17 PROCEDURE — 0 TECHNETIUM SESTAMIBI: Performed by: INTERNAL MEDICINE

## 2024-01-17 RX ADMIN — TECHNETIUM TC 99M SESTAMIBI 1 DOSE: 1 INJECTION INTRAVENOUS at 08:30

## 2024-01-17 RX ADMIN — TECHNETIUM TC 99M SESTAMIBI 1 DOSE: 1 INJECTION INTRAVENOUS at 10:45

## 2024-01-18 LAB
BH CV REST NUCLEAR ISOTOPE DOSE: 9.9 MCI
BH CV STRESS BP STAGE 1: NORMAL
BH CV STRESS DURATION MIN STAGE 1: 3
BH CV STRESS DURATION MIN STAGE 2: 2
BH CV STRESS DURATION SEC STAGE 1: 0
BH CV STRESS DURATION SEC STAGE 2: 5
BH CV STRESS GRADE STAGE 1: 10
BH CV STRESS GRADE STAGE 2: 12
BH CV STRESS HR STAGE 1: 122
BH CV STRESS HR STAGE 2: 139
BH CV STRESS METS STAGE 1: 5
BH CV STRESS METS STAGE 2: 7.5
BH CV STRESS NUCLEAR ISOTOPE DOSE: 32.8 MCI
BH CV STRESS O2 STAGE 1: 97
BH CV STRESS O2 STAGE 2: 95
BH CV STRESS PROTOCOL 1: NORMAL
BH CV STRESS RECOVERY BP: NORMAL MMHG
BH CV STRESS RECOVERY HR: 83 BPM
BH CV STRESS RECOVERY O2: 96 %
BH CV STRESS SPEED STAGE 1: 1.7
BH CV STRESS SPEED STAGE 2: 2.5
BH CV STRESS STAGE 1: 1
BH CV STRESS STAGE 2: 2
LV EF NUC BP: 80 %
MAXIMAL PREDICTED HEART RATE: 147 BPM
PERCENT MAX PREDICTED HR: 94.56 %
STRESS BASELINE BP: NORMAL MMHG
STRESS BASELINE HR: 62 BPM
STRESS O2 SAT REST: 96 %
STRESS PERCENT HR: 111 %
STRESS POST ESTIMATED WORKLOAD: 6.2 METS
STRESS POST EXERCISE DUR MIN: 5 MIN
STRESS POST EXERCISE DUR SEC: 5 SEC
STRESS POST O2 SAT PEAK: 95 %
STRESS POST PEAK BP: NORMAL MMHG
STRESS POST PEAK HR: 139 BPM
STRESS TARGET HR: 125 BPM
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
## 2024-02-28 ENCOUNTER — OFFICE VISIT (OUTPATIENT)
Dept: CARDIOLOGY | Facility: CLINIC | Age: 74
End: 2024-02-28
Payer: MEDICARE

## 2024-02-28 VITALS
OXYGEN SATURATION: 96 % | WEIGHT: 285 LBS | HEIGHT: 72 IN | HEART RATE: 66 BPM | BODY MASS INDEX: 38.6 KG/M2 | SYSTOLIC BLOOD PRESSURE: 153 MMHG | DIASTOLIC BLOOD PRESSURE: 73 MMHG

## 2024-02-28 DIAGNOSIS — I10 PRIMARY HYPERTENSION: ICD-10-CM

## 2024-02-28 DIAGNOSIS — I25.10 CORONARY ARTERY DISEASE INVOLVING NATIVE CORONARY ARTERY OF NATIVE HEART WITHOUT ANGINA PECTORIS: Primary | ICD-10-CM

## 2024-02-28 DIAGNOSIS — E78.2 MIXED HYPERLIPIDEMIA: ICD-10-CM

## 2024-02-28 PROCEDURE — 99214 OFFICE O/P EST MOD 30 MIN: CPT | Performed by: NURSE PRACTITIONER

## 2024-02-28 PROCEDURE — 1160F RVW MEDS BY RX/DR IN RCRD: CPT | Performed by: NURSE PRACTITIONER

## 2024-02-28 PROCEDURE — 3078F DIAST BP <80 MM HG: CPT | Performed by: NURSE PRACTITIONER

## 2024-02-28 PROCEDURE — 3077F SYST BP >= 140 MM HG: CPT | Performed by: NURSE PRACTITIONER

## 2024-02-28 PROCEDURE — 1159F MED LIST DOCD IN RCRD: CPT | Performed by: NURSE PRACTITIONER

## 2024-02-28 RX ORDER — NEBIVOLOL 2.5 MG/1
2.5 TABLET ORAL DAILY
Qty: 30 TABLET | Refills: 11 | Status: SHIPPED | OUTPATIENT
Start: 2024-02-28

## 2024-02-28 RX ORDER — OMEPRAZOLE 40 MG/1
CAPSULE, DELAYED RELEASE ORAL
COMMUNITY
Start: 2024-01-22
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
## 2024-03-04 ENCOUNTER — OFFICE VISIT (OUTPATIENT)
Dept: PAIN MEDICINE | Facility: CLINIC | Age: 74
End: 2024-03-04
Payer: MEDICARE

## 2024-03-04 VITALS — WEIGHT: 288 LBS | HEIGHT: 72 IN | BODY MASS INDEX: 39.01 KG/M2

## 2024-03-04 DIAGNOSIS — Z98.1 HISTORY OF FUSION OF CERVICAL SPINE: ICD-10-CM

## 2024-03-04 DIAGNOSIS — M50.30 DDD (DEGENERATIVE DISC DISEASE), CERVICAL: ICD-10-CM

## 2024-03-04 DIAGNOSIS — E11.65 TYPE 2 DIABETES MELLITUS WITH HYPERGLYCEMIA, WITHOUT LONG-TERM CURRENT USE OF INSULIN: ICD-10-CM

## 2024-03-04 DIAGNOSIS — M19.012 BILATERAL SHOULDER REGION ARTHRITIS: ICD-10-CM

## 2024-03-04 DIAGNOSIS — M19.011 BILATERAL SHOULDER REGION ARTHRITIS: ICD-10-CM

## 2024-03-04 DIAGNOSIS — M67.919 TENDINOPATHY OF ROTATOR CUFF, UNSPECIFIED LATERALITY: ICD-10-CM

## 2024-03-04 DIAGNOSIS — M99.71 CONN TISS AND DISC STENOSIS OF INTVRT FORAMIN OF CERV REGION: ICD-10-CM

## 2024-03-04 PROCEDURE — 99214 OFFICE O/P EST MOD 30 MIN: CPT | Performed by: NURSE PRACTITIONER

## 2024-03-04 PROCEDURE — 1160F RVW MEDS BY RX/DR IN RCRD: CPT | Performed by: NURSE PRACTITIONER

## 2024-03-04 PROCEDURE — 1126F AMNT PAIN NOTED NONE PRSNT: CPT | Performed by: NURSE PRACTITIONER

## 2024-03-04 PROCEDURE — 1159F MED LIST DOCD IN RCRD: CPT | Performed by: NURSE PRACTITIONER

## 2024-03-04 NOTE — PROGRESS NOTES
"Chief Complaint: \"Neck and shoulder pain\"         History of Present Illness:   Patient: Mr. Maxime Rodriguez, 73 y.o. male originally referred by Annita Patino PA-C, in consultation for chronic intractable neck and upper extremity pain. He reports a longstanding history of chronic intractable neck pain.  He has a history of undergoing ACDF at C6-C7 in 2011 by Dr. Tee.  He did quite well there after surgery.  Around August 2023, he was doing some yard work, and was pulling on a garden hose and fell backwards and flipped since that time, he has developed stiffness in his neck with pain into his shoulders and soreness in his forearms.  Symptoms are worse at night.  He has been treated with steroids, several different medications such as NSAIDs, Flexeril, tramadol, Celebrex and gabapentin, without relief. MRI of the cervical spine on 8/25/2023 demonstrates evidence of prior discectomy and fusion at C6-C7. Multilevel facet arthropathy. C3-C4: Diffuse annular bulge. Facet arthropathy. Mild canal stenosis and mild bilateral foraminal stenosis. At C5-C6: Endplate hypertrophy eccentric to the right. Facet arthropathy. Lateral recess stenosis. Mild to moderate right foraminal stenosis.  He underwent neurosurgical consultation with Dr Tee and was found not to be a surgical candidate.  We last saw him on November 15, 2023, when he underwent a cervical epidural steroid injection, from which he reports no significant pain relief or reduction in his pain levels.  He underwent follow-up neurosurgical consultation with Annita Patino PA-C on 12/12/2023, and additionally reported no relief with epidural injection.  He was continued to have quite a bit of pain, Nicole recommended the possibility of a CT myelogram.  He also opted to undergo orthopedic consultation in regards to his ongoing shoulder pain, he received, bilateral shoulder joint injections, which have provided him with quite a bit of pain relief.  He has " opted to wait on myelogram.  He did undergo recent electrodiagnostic studies of the bilateral upper extremities which demonstrates mild left ulnar neuropathy, underlying peripheral neuropathy, no evidence of radiculopathy.  Upon further conversation, it appears as though he was dealing with 2 sources of pain, one coming from his cervical spine and the other from his shoulders, as to why potentially he did not feel much relief with epidural injection, due to continued pain in his shoulders.  He has failed to obtain pain relief with conservative measures including oral analgesics, opioids, topical analgesics, ice, heat, physical therapy, physical therapist directed home exercise program HEP (ongoing), therapeutic massage, to name a few.  Pain Description: Previously a constant posterior neck, shoulder and RT>LT upper extremity pain with intermittent exacerbation, described as aching, dull, shooting, numbing, and tingling sensation.   Radiation of Pain: The pain no longer radiates into the shoulders and down into his arms  Pain intensity today: 2/10   Average pain intensity last week: 3/10  Pain intensity ranges from: 0/10 to 5/10  Aggravating factors: Pain increases with lifting, pushing, pulling, using his arms  Alleviating factors: Pain decreases with nothing  Associated Symptoms:   Patient reports pain and weakness but denies numbness in the upper extremities.   Patient denies any new bladder or bowel problems.   Patient denies difficulties with his balance or recent falls.   Pain interferes with general activities and affects patient's quality of life  Pain interferes with sleep causing sleep fragmentation   Stiffness    Review of previous therapies and additional medical records:  Maxime Rodriguez has already failed the following measures, including:   Conservative Measures: Oral analgesics, opioids, topical analgesics, ice, heat, physical therapy, physical therapist directed home exercise program HEP (ongoing),  therapeutic massage  Interventional Measures: 11/15/2023: DANIA  Bilateral shoulder injections with Ortho (Dr. Wing) in December  Surgical Measures: Hx ACDF C6-C7 in 2011 by Dr. Tee.  Maxime Rodriguez underwent neurosurgical consultation with Annita Patino PA-C on 12/12/2023, and was found not to be a surgical candidate.  Maxime Rodriguez presents with significant comorbidities including coronary artery disease, diabetes mellitus, hyperlipidemia, hypertension, peripheral neuropathy, obesity, on aspirin 81 mg  In terms of current analgesics, Maxime Rodriguez takes: celecoxib, cyclobenzaprine, gabapentin  I have reviewed [place] Report consistent with medication reconciliation.  SOAPP/ORT: Low Risk         Global Pain Scale 11-07 2023 03-04 2024         Pain 20 6         Feelings 10 3         Clinical outcomes 14 1         Activities 10 2         GPS Total: 54 12           NECK PAIN DISABILITY INDEX QUESTIONNAIRE  DATE 11-07 2023 03-04 2024          Pain intensity  0: No pain  1: Mild  2: Moderate  3: Fairly severe  4: very severe  5: Worst imaginable 3  1          Personal Care   0: I can look after myself normally without causing extra pain.  1: I can look after myself normally, but it causes extra pain.  2: It is painful to look after myself and I am slow and careful.  3: I need some help, but manage most of my personal care.  4: I need help every day in most aspects of self-care.  5: I do not get dressed; I wash with difficulty and stay in bed. 1 0          Lifting  0: I can lift heavy weights without extra pain.  1: I can lift heavy weights, but it gives extra pain.  2: Pain prevents me from lifting heavy weights off the floor but I can manage if they are conveniently positioned, for example, on a table.  3: Pain prevents me from lifting heavy weights, but I can manage light to medium weights if they are conveniently positioned.  4: I can lift very light weights.  5: I cannot lift or carry anything at all.  3 3          Reading  0: I can read as much as I want to with no pain in my neck.  1: I can read as much as I want to with slight pain in my neck.  2: I can read as much as I want to with moderate pain in my neck.  3: I cannot read as much as I want because of moderate pain in my neck.  4: I cannot read as much as I want because of severe pain in my neck.  5: I cannot read at all.  2 1          Headaches  0: I have no headaches at all.  1: I have slight headaches which come infrequently.  2: I have moderate headaches which come infrequently.  3: I have moderate headaches which come frequently.  4: I have severe headaches which come frequently.  5: I have headaches almost all the time.  1 0          Concentration  0: I can concentrate fully when I want to with no difficulty.  1: I can concentrate fully when I want to with slight difficulty.  2: I have a fair degree of difficulty in concentrating when I want to.  3: I have a lot of difficulty in concentrating when I want to.  4: I have a great deal of difficulty in concentrating when I want to.  5: I cannot concentrate at all.  0 0          Work  0: I can do as much work as I want to.  1: I can only do my usual work, but no more.  2: I can do most of my usual work, but no more.  3: I cannot do my usual work.  4: I can hardly do any work at all.  5: I cannot do any work at all.  4 2          Driving  0: I can drive my car without any neck pain.  1: I can drive my car as long as I want with slight pain in my neck.  2: I can drive my car as long as I want with moderate pain in my   neck.  3: I cannot drive my car as long as I want because of moderate pain   in my neck.  4: I can hardly drive at all because of severe pain in my neck.  5: I cannot drive my car at all.  1 0          Sleeping  0: I have no trouble sleeping.  1: My sleep is slightly disturbed (less than 1 hour sleepless).  2: My sleep is mildly disturbed (1-2 hours sleepless).  3: My sleep is moderately  disturbed (2-3 hours sleepless).  4: My sleep is greatly disturbed (3-5 hours sleepless).  5: My sleep is completely disturbed (5-7 hours)  3 1          Recreation  0: I am able to engage in all of my recreational activities with no neck pain at all.  1: I am able to engage in all of my recreational activities with some pain in my neck.  2: I am able to engage in most, but not all of my recreational activities because of pain in my neck.  3: I am able to engage in a few of my recreational activities because of pain in my neck.  4: I can hardly do any recreational activities because of pain in my neck.  5: I cannot do any recreational activities at all.  4 1          TOTAL SCORE 22 9              Review of New Diagnostic Studies:  EMG/NCV of the bilateral upper extremities 12/12/2023: mild left ulnar neuropathy, underlying peripheral neuropathy, no evidence of radiculopathy  Bilateral shoulder x-rays 11/7/2023: Mild to moderate spurring of the AC joints with osteoarthritis.    Review of Diagnostic Studies:   MRI of the cervical spine on 8/25/2023 Multilevel facet arthropathy. The spinal cord shows normal signal .  C2-C3: Facet arthropathy. facet arthropathy. No significant canal or foraminal stenosis.  C3-C4: Diffuse annular bulge. Facet arthropathy. Mild canal stenosis and mild bilateral foraminal stenosis  C4-C5: Facet arthropathy. facet arthropathy. No significant canal or foraminal stenosis  C5-C6: Endplate hypertrophy eccentric to the right. Facet arthropathy. Mild to moderate right foraminal stenosis  C6-C7: Evidence of prior discectomy and fusion  C7-T1: Disc bulge. Facet arthropathy. Mild bilateral foraminal stenosis.    Review of Systems   Constitutional:  Positive for fatigue.   Respiratory:  Positive for chest tightness and shortness of breath.    Gastrointestinal:  Positive for nausea.   Musculoskeletal:  Positive for arthralgias.   All other systems reviewed and are negative.        Patient Active  Problem List   Diagnosis    Non-STEMI (non-ST elevated myocardial infarction)    Hypertension    Hyperlipidemia    Diabetes mellitus    Coronary artery disease involving native coronary artery of native heart without angina pectoris    Class 2 obesity in adult    Cervical spondylosis without myelopathy    History of fusion of cervical spine    DDD (degenerative disc disease), cervical    Cervical strain    Conn tiss and disc stenosis of intvrt foramin of cerv region    Shoulder impingement    Tendinopathy of rotator cuff       Past Medical History:   Diagnosis Date    Arthritis     Cervical disc disorder     Coronary artery disease involving native coronary artery of native heart without angina pectoris     CTS (carpal tunnel syndrome)     Diabetes mellitus     Extremity pain     Hyperlipidemia     Hypertension     Neck pain 08-    Non-STEMI (non-ST elevated myocardial infarction)     Peripheral neuropathy          Past Surgical History:   Procedure Laterality Date    CARDIAC CATHETERIZATION N/A 03/29/2018    Procedure: Left Heart Cath;  Surgeon: Andrea Enriquez MD;  Location: [facility] CATH INVASIVE LOC[facility];  Service: Cardiovascular    CAROTID STENT      CARPAL TUNNEL RELEASE      CERVICAL DISCECTOMY LAMINECTOMY DECOMPRESSION POSTERIOR FUSION WITH INSTRUMENTATION      ACDF C6-7  09/19/11    CORONARY STENT PLACEMENT  03/29/2018    EPIDURAL BLOCK      HEMORRHOIDECTOMY      NECK SURGERY      ORTHOPEDIC SURGERY  2011    SPINAL FUSION      TONSILLECTOMY           Family History   Problem Relation Age of Onset    Heart disease Mother     Hyperlipidemia Mother     Heart attack Maternal Grandfather     Alcohol abuse Father          Social History     Socioeconomic History    Marital status:    Tobacco Use    Smoking status: Former     Current packs/day: 0.00     Average packs/day: 1 pack/day for 10.0 years (10.0 ttl pk-yrs)     Types: Cigarettes     Start date: 1/1/1968     Quit date: 1/1/1978      "Years since quittin.2    Smokeless tobacco: Never   Substance and Sexual Activity    Alcohol use: Not Currently     Comment: occasional    Drug use: No    Sexual activity: Yes     Partners: Female     Birth control/protection: None           Current Outpatient Medications:     aliskiren (TEKTURNA) 300 MG tablet, , Disp: , Rfl:     aspirin 81 MG EC tablet, Take 1 tablet by mouth Daily., Disp: 30 tablet, Rfl: 1    celecoxib (CeleBREX) 200 MG capsule, Take 1 capsule by mouth Daily., Disp: , Rfl:     nebivolol (Bystolic) 2.5 MG tablet, Take 1 tablet by mouth Daily., Disp: 30 tablet, Rfl: 11    omeprazole (priLOSEC) 40 MG capsule, , Disp: , Rfl:     pioglitazone (ACTOS) 45 MG tablet, Take 1 tablet by mouth Daily., Disp: , Rfl:     rosuvastatin (CRESTOR) 20 MG tablet, Take 1 tablet by mouth Every Night. (Patient taking differently: Take 0.5 tablets by mouth Every Night.), Disp: 30 tablet, Rfl: 11    SITagliptin (JANUVIA) 100 MG tablet, Take 1 tablet by mouth Daily., Disp: , Rfl:       Allergies   Allergen Reactions    Oxycodone-Acetaminophen GI Intolerance    Other Diarrhea, Nausea Only and Nausea And Vomiting    Atorvastatin Myalgia    Augmentin [Amoxicillin-Pot Clavulanate] Rash    Cephalexin Rash    Pedi-Pre Tape Spray [Wound Dressing Adhesive] Rash    Penicillins Rash         Ht 182.9 cm (72\")   Wt 131 kg (288 lb)   BMI 39.06 kg/m²       Physical Exam:  Constitutional: Patient appears well-developed, well-nourished, well-hydrated, appears younger than stated age  HEENT: Head: Normocephalic and atraumatic  Eyes: Conjunctivae and lids are normal  Pupils: Equal, round, reactive to light  Neck: Trachea normal. Neck supple. No JVD present.   Lymphatic: No cervical adenopathy  Musculoskeletal   Gait and station: Gait evaluation demonstrated a normal gait.   Cervical Spine: Passive and active range of motion are almost full and without pain. Extension, flexion, lateral flexion, rotation of the cervical spine did not " increase or reproduce pain. Cervical facet joint loading maneuvers are negative.   Muscles: Presence of active trigger points: None  Shoulders: The range of motion of the glenohumeral joints is improved today, there continues to be some mild pain with range of motion. Rotator cuff strength is overall 5/5,  Neurological:   Patient is alert and oriented to person, place, and time.   Speech: Normal.   Cortical function: Normal mental status.   Cranial nerves 2-12: intact.   Reflex Scores:  Right brachioradialis: 1+  Left brachioradialis: 1+  Right biceps: 1+  Left biceps: 1+  Right triceps: 1+  Left triceps: 1+  Right patellar: 0+  Left patellar: 0+  Right Achilles: 0+  Left Achilles: 0+  Motor strength: 5/5  Motor Tone: Normal  Involuntary movements: None.   Superficial/Primitive Reflexes: Primitive reflexes were absent.   Right Blevins: Absent  Left Blevins: Absent  Right ankle clonus: Absent  Left ankle clonus: Absent   Babinsky: Absent  Spurling sign: Negative. Neck tornado test: Negative. Lhermitte sign: Negative. Long tract signs: Negative.   Sensory exam: Intact to light touch, intact pain and temperature sensation, intact vibration sensation and normal proprioception  Coordination: Finger to nose: Normal. Heel to shin: Normal. Balance: Normal Romberg's sign: Negative   Skin and subcutaneous tissue: Skin is warm and intact. No rash noted. No cyanosis.   Psychiatric: Judgment and insight: Normal. Recent and remote memory: Intact. Mood and affect: Normal.       ASSESSMENT:   1. Conn tiss and disc stenosis of intvrt foramin of cerv region    2. Bilateral shoulder region arthritis    3. Tendinopathy of rotator cuff, unspecified laterality    4. History of fusion of cervical spine    5. DDD (degenerative disc disease), cervical    6. Type 2 diabetes mellitus with hyperglycemia, without long-term current use of insulin        PLAN/MEDICAL DECISION MAKING:  Mr. Maxime Rodriguez, 73 y.o. male reports a longstanding history  of chronic intractable neck pain.  He has a history of undergoing ACDF at C6-C7 in 2011 by Dr. Tee.  He did quite well there after surgery.  Around August 2023, he was doing some yard work, and was pulling on a garden hose and fell backwards and flipped since that time, he has developed stiffness in his neck with pain into his shoulders and soreness in his forearms.  Symptoms are worse at night.  He has been treated with steroids, several different medications such as NSAIDs, Flexeril, tramadol, Celebrex and gabapentin, without relief. MRI of the cervical spine on 8/25/2023 demonstrates evidence of prior discectomy and fusion at C6-C7. Multilevel facet arthropathy. C3-C4: Diffuse annular bulge. Facet arthropathy. Mild canal stenosis and mild bilateral foraminal stenosis. At C5-C6: Endplate hypertrophy eccentric to the right. Facet arthropathy. Lateral recess stenosis. Mild to moderate right foraminal stenosis.  He underwent neurosurgical consultation with Dr Tee and was found not to be a surgical candidate.  We last saw him on November 15, 2023, when he underwent a cervical epidural steroid injection, from which he reports no significant pain relief or reduction in his pain levels.  He underwent follow-up neurosurgical consultation with Annita Patino PA-C on 12/12/2023, and additionally reported no relief with epidural injection.  He was continued to have quite a bit of pain, Nicole recommended the possibility of a CT myelogram.  He also opted to undergo orthopedic consultation in regards to his ongoing shoulder pain, he received, bilateral shoulder joint injections, which have provided him with quite a bit of pain relief.  He has opted to wait on myelogram.  He did undergo recent electrodiagnostic studies of the bilateral upper extremities which demonstrates mild left ulnar neuropathy, underlying peripheral neuropathy, no evidence of radiculopathy.  Going to continue to monitor symptoms, he has adequate  range of motion of his shoulders and neck today, with mild pain.  If he has recurrence of his shoulder pain, he will attempt to see orthopedics in regards to getting injections, I have also informed him, he may receive those injections with our office.  He has failed to obtain pain relief with conservative measures including oral analgesics, opioids, topical analgesics, ice, heat, physical therapy, physical therapist directed home exercise program HEP (ongoing), therapeutic massage, to name a few.  I had a lengthy conversation with Mr. Maxime Rodriguez regarding his chronic pain condition and potential therapeutic options including risks, benefits, alternative therapies, to name a few. We have discussed using a stepwise approach starting with the least intense level of care as determined by the extent required to diagnose and or treat a patient's condition. The proposed treatments are consistent with the patient's medical condition and known to be safe and effective by current guidelines and the standard of care. The duration and frequency proposed are considered appropriate for the service in accordance with accepted standards of medical practice for the diagnosis and treatment of the patient's condition and intended to improve the patient's level of function. These services will be furnished in a setting appropriate to the patient's medical needs and condition. Therefore, I have proposed the following plan:  1. Interventional pain management measures:   A. Treatment of neck pain: None indicated at this time.  In the instance he has recurrence of neck and arm pain, we may consider repeating cervical epidural steroid injection by interlaminar approach using the lowest effective dose of steroids, under C-arm fluoroscopic guidance, with the use of contrast dye (unless contraindicated) to confirm appropriate needle placement and spread of contrast dye. We may repeat depending on patient's outcome and following current  guidelines: Epidurals will be limited to a maximum of 4 sessions per spinal region in a rolling twelve (12) month period. Continuation of epidural steroid injections over 12 months would only be considered under the following provisions;  Patient is a high-risk surgical candidate, or the patient does not desire surgery, or recurrence of pain in the same location relieved with ESIs for at least three months and epidural provides at least 50% sustained improvement of pain and/or 50% objective improvement in function (using same scale as baseline)  Pain is severe enough to cause a significant degree of functional disability or vocational disability  The primary care provider will be notified regarding continuation of procedures and repeat steroid use   Patient will follow-up with Dr. Tee thereafter.   B. Treatment of Bilateral Shoulder Pain/Impingement/Rotator Cuff Tendinopathy: We have discussed prospects of diagnostic and therapeutic bilateral subacromial bursa injections combined with tendon sheath injections of the bilateral supraspinatus   3. Pharmacological measures: Reviewed and discussed;   A. Patient takes  celecoxib, cyclobenzaprine, gabapentin  B.  Continue prilocaine 2%, lidocaine 10%, imipramine 3%, capsaicin 0.001%, and mannitol 20%  cream, apply 1 to 2 grams of cream to the shoulder areas every 4 to 6 hours as needed  4. Long-term rehabilitation efforts:  A. The patient does not have a history of falls.   B. Patient will start a comprehensive physical therapy program for neurodynamics, upper body strengthening, posture correction, ultrasound, ASTYM, E-STIM, myofascial release, cupping, dry needling, home exercise program, 2-3 x per week for 8 weeks   C. Contrast therapy: Apply ice-packs for 15-20 minutes, followed by heating pads for 15-20 minutes to affected area   D. Start a low impact exercise program such as water therapy, swimming, yoga, daily walks for fitness  E. Patient's Body mass index is  39.33 kg/m². Patient counseled on the importance of weight loss to help with overall health and pain control.   5. The patient has been instructed to contact my office with any questions or difficulties. The patient understands the plan and agrees to proceed accordingly.      Pain Medications               aspirin 81 MG EC tablet Take 1 tablet by mouth Daily.    celecoxib (CeleBREX) 200 MG capsule Take 1 capsule by mouth Daily.             No orders of the defined types were placed in this encounter.       Please note that portions of this note were completed with a voice recognition program.     KACEY Medina    Patient Care Team:  Brad Huang MD as PCP - General (Internal Medicine)  Georgi Lanza MD as Referring Physician (Internal Medicine)  Ravinder Jama MD as Consulting Physician (Pain Medicine)     No orders of the defined types were placed in this encounter.        No future appointments.

## 2024-05-29 ENCOUNTER — TELEPHONE (OUTPATIENT)
Dept: CARDIOLOGY | Facility: CLINIC | Age: 74
End: 2024-05-29
Payer: MEDICARE

## 2024-05-29 NOTE — TELEPHONE ENCOUNTER
Left VM in response to his message saying his diuretic was stopped due to charley horses, and was given Norvasc which is causing ankle swelling.  Left VM advising we do not show record of patient being on Norvasc and to contact office to discuss further.

## 2024-08-26 ENCOUNTER — TRANSCRIBE ORDERS (OUTPATIENT)
Dept: ADMINISTRATIVE | Facility: HOSPITAL | Age: 74
End: 2024-08-26
Payer: MEDICARE

## 2024-08-26 DIAGNOSIS — R31.29 MICROSCOPIC HEMATURIA: Primary | ICD-10-CM

## 2024-08-31 ENCOUNTER — HOSPITAL ENCOUNTER (OUTPATIENT)
Facility: HOSPITAL | Age: 74
Discharge: HOME | End: 2024-08-31
Payer: MEDICARE

## 2024-08-31 DIAGNOSIS — R31.29 MICROSCOPIC HEMATURIA: ICD-10-CM

## 2024-08-31 LAB — CREAT BLDA-MCNC: 1.2 MG/DL (ref 0.6–1.3)

## 2024-08-31 PROCEDURE — 82565 ASSAY OF CREATININE: CPT

## 2024-08-31 PROCEDURE — 74178 CT ABD&PLV WO CNTR FLWD CNTR: CPT

## 2024-08-31 PROCEDURE — 25510000001 IOPAMIDOL PER 1 ML: Performed by: UROLOGY

## 2024-08-31 RX ORDER — IOPAMIDOL 755 MG/ML
150 INJECTION, SOLUTION INTRAVASCULAR
Status: COMPLETED | OUTPATIENT
Start: 2024-08-31 | End: 2024-08-31

## 2024-08-31 RX ADMIN — IOPAMIDOL 150 ML: 755 INJECTION, SOLUTION INTRAVENOUS at 16:22
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
## 2025-03-04 ENCOUNTER — OFFICE VISIT (OUTPATIENT)
Dept: CARDIOLOGY | Facility: CLINIC | Age: 75
End: 2025-03-04
Payer: MEDICARE

## 2025-03-04 VITALS
WEIGHT: 273 LBS | HEIGHT: 72 IN | OXYGEN SATURATION: 90 % | DIASTOLIC BLOOD PRESSURE: 89 MMHG | SYSTOLIC BLOOD PRESSURE: 162 MMHG | HEART RATE: 60 BPM | BODY MASS INDEX: 36.98 KG/M2

## 2025-03-04 DIAGNOSIS — I25.10 CORONARY ARTERY DISEASE INVOLVING NATIVE CORONARY ARTERY OF NATIVE HEART WITHOUT ANGINA PECTORIS: Primary | ICD-10-CM

## 2025-03-04 PROCEDURE — 99214 OFFICE O/P EST MOD 30 MIN: CPT | Performed by: INTERNAL MEDICINE

## 2025-03-04 PROCEDURE — 1160F RVW MEDS BY RX/DR IN RCRD: CPT | Performed by: INTERNAL MEDICINE

## 2025-03-04 PROCEDURE — 1159F MED LIST DOCD IN RCRD: CPT | Performed by: INTERNAL MEDICINE

## 2025-03-04 PROCEDURE — 3077F SYST BP >= 140 MM HG: CPT | Performed by: INTERNAL MEDICINE

## 2025-03-04 PROCEDURE — 3079F DIAST BP 80-89 MM HG: CPT | Performed by: INTERNAL MEDICINE

## 2025-03-04 RX ORDER — GLIPIZIDE 5 MG/1
5 TABLET, FILM COATED, EXTENDED RELEASE ORAL 2 TIMES DAILY
COMMUNITY

## 2025-03-04 RX ORDER — CLONIDINE HYDROCHLORIDE 0.2 MG/1
TABLET ORAL
COMMUNITY
Start: 2024-08-05

## 2025-03-04 RX ORDER — TRAZODONE HYDROCHLORIDE 50 MG/1
TABLET ORAL
COMMUNITY
Start: 2025-02-05

## 2025-03-04 RX ORDER — FUROSEMIDE 40 MG/1
TABLET ORAL
COMMUNITY

## 2025-03-04 RX ORDER — INSULIN GLARGINE 100 [IU]/ML
30 INJECTION, SOLUTION SUBCUTANEOUS DAILY
COMMUNITY
Start: 2025-01-21

## (undated) DEVICE — DEV COMP RAD PRELUDESYNC 24CM

## (undated) DEVICE — CATH DIAG EXPO .056 FL3.5 6F 100CM

## (undated) DEVICE — HI-TORQUE WHISPER MS GUIDE WIRE .014 STRAIGHT TIP 3.0 CM X 190 CM: Brand: HI-TORQUE WHISPER

## (undated) DEVICE — TREK CORONARY DILATATION CATHETER 3.0 MM X 15 MM / RAPID-EXCHANGE: Brand: TREK

## (undated) DEVICE — Device

## (undated) DEVICE — GLIDESHEATH SLENDER STAINLESS STEEL KIT: Brand: GLIDESHEATH SLENDER

## (undated) DEVICE — CATH DIAG EXPO M/ PK 6FR FL4/FR4 PIG 3PK

## (undated) DEVICE — GUIDE CATHETER: Brand: MACH1™